# Patient Record
Sex: FEMALE | Race: WHITE | NOT HISPANIC OR LATINO | Employment: FULL TIME | URBAN - NONMETROPOLITAN AREA
[De-identification: names, ages, dates, MRNs, and addresses within clinical notes are randomized per-mention and may not be internally consistent; named-entity substitution may affect disease eponyms.]

---
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
## 2020-10-29 ENCOUNTER — OFFICE VISIT (OUTPATIENT)
Dept: OBSTETRICS AND GYNECOLOGY | Facility: CLINIC | Age: 20
End: 2020-10-29

## 2020-10-29 VITALS
DIASTOLIC BLOOD PRESSURE: 74 MMHG | HEIGHT: 67 IN | BODY MASS INDEX: 26.68 KG/M2 | WEIGHT: 170 LBS | SYSTOLIC BLOOD PRESSURE: 126 MMHG

## 2020-10-29 DIAGNOSIS — N94.6 DYSMENORRHEA: Primary | ICD-10-CM

## 2020-10-29 DIAGNOSIS — N92.0 MENORRHAGIA WITH REGULAR CYCLE: ICD-10-CM

## 2020-10-29 PROCEDURE — 99203 OFFICE O/P NEW LOW 30 MIN: CPT | Performed by: OBSTETRICS & GYNECOLOGY

## 2020-10-29 RX ORDER — NORETHINDRONE ACETATE AND ETHINYL ESTRADIOL, AND FERROUS FUMARATE 1MG-20(24)
1 KIT ORAL DAILY
Qty: 28 CAPSULE | Refills: 3 | COMMUNITY
Start: 2020-10-29 | End: 2021-03-24 | Stop reason: RX

## 2020-10-29 NOTE — PROGRESS NOTES
Subjective   Mara Slaughter is a 20 y.o. female  YOB: 2000    Chief Complaint   Patient presents with   • Contraception     Pt is here for contraception  Pt does have heavy periods.  She feels like the birth control will help      20 year old female  Patient's last menstrual period was 10/06/2020 (exact date) presents to establish care. Patient reports that her cycles are regular lasting five days. She reports that three days are heavy and she has to change a super tampon every couple of hours. She also reports that her cycles have progressively become more painful. She denies any medical problems at this time. She is not sexually active at this time but has been previously. She is getting  in December of this year. She denies drinking, smoking or drug use. She denies any history of migraines with aura. Denies any family history of blood clots or bleeding disorders. She does report a family history of breast cancer in her maternal grandmother.     Allergies   Allergen Reactions   • Cefdinir Hives and Itching   • Amoxicillin Rash   • Penicillins Swelling and Rash       Past Medical History:   Diagnosis Date   • Fracture of 5th metatarsal     Left   • PONV (postoperative nausea and vomiting)        Family History   Problem Relation Age of Onset   • Hypertension Father    • Breast cancer Maternal Grandmother        Social History     Socioeconomic History   • Marital status: Single     Spouse name: Not on file   • Number of children: Not on file   • Years of education: Not on file   • Highest education level: Not on file   Tobacco Use   • Smoking status: Never Smoker   • Smokeless tobacco: Never Used   Substance and Sexual Activity   • Alcohol use: No   • Drug use: No   • Sexual activity: Not Currently     Partners: Male     Birth control/protection: None         Current Outpatient Medications:   •  Norethin Ace-Eth Estrad-FE (Taytulla) 1-20 MG-MCG(24) capsule, Take 1 tablet by mouth Daily.,  Disp: 28 capsule, Rfl: 3    Patient's last menstrual period was 10/06/2020 (exact date).    Sexual History:         Could not be calculated    Past Surgical History:   Procedure Laterality Date   • EYE SURGERY      15 months of age   • TOE PERCUTANEOUS PINNING Left 7/18/2018    Procedure: PERCUTANEOUS SCREW FIXATION LEFT 5TH METATARSAL BASE;  Surgeon: Joseph Watkins MD;  Location: [facility];  Service: Orthopedics   • TONSILLECTOMY         Review of Systems   Constitutional: Negative for activity change and unexpected weight loss.   HENT: Negative for congestion.    Cardiovascular: Negative for chest pain.   Gastrointestinal: Negative for blood in stool, constipation and diarrhea.   Endocrine: Negative for cold intolerance and heat intolerance.   Genitourinary: Negative for dyspareunia, pelvic pain and vaginal discharge.   Musculoskeletal: Negative for arthralgias, back pain, neck pain and neck stiffness.   Skin: Negative for rash.   Neurological: Negative for dizziness and headache.   Psychiatric/Behavioral: Negative for sleep disturbance. The patient is not nervous/anxious.        Objective   Physical Exam  Vitals signs and nursing note reviewed.   Constitutional:       General: She is not in acute distress.     Appearance: She is well-developed.   HENT:      Head: Normocephalic and atraumatic.   Eyes:      General:         Right eye: No discharge.         Left eye: No discharge.      Conjunctiva/sclera: Conjunctivae normal.   Neck:      Musculoskeletal: Normal range of motion.      Thyroid: No thyromegaly.   Cardiovascular:      Rate and Rhythm: Normal rate and regular rhythm.      Heart sounds: No murmur.   Pulmonary:      Effort: Pulmonary effort is normal.      Breath sounds: Normal breath sounds. No wheezing or rhonchi.   Abdominal:      General: There is no distension.      Palpations: Abdomen is soft.      Tenderness: There is no abdominal tenderness.   Musculoskeletal: Normal range of motion.  "  Skin:     General: Skin is warm and dry.   Neurological:      Mental Status: She is alert and oriented to person, place, and time.   Psychiatric:         Behavior: Behavior normal.         Judgment: Judgment normal.           Vitals:    10/29/20 1135   BP: 126/74   Weight: 77.1 kg (170 lb)   Height: 168.9 cm (66.5\")       Diagnoses and all orders for this visit:    1. Dysmenorrhea (Primary)    2. Menorrhagia with regular cycle    Other orders  -     Norethin Ace-Eth Estrad-FE (Taytulla) 1-20 MG-MCG(24) capsule; Take 1 tablet by mouth Daily.  Dispense: 28 capsule; Refill: 3    -Discussed different management options for contraception at this time including OCPs, Orthoevra, Nuvaring, Depoprovera, Nexplanon and IUDs. Patient elected to be started on OCPs at this time. Discussed with patient risk, benefits at this time. Risk including increased risk for blood clots discussed.   -Counseled patient about safe sexual practices   -RTC for annual examination and PAP smear in 4 months or sooner if symptoms worsen.     Charelne Panda, DO       "

## 2021-01-08 ENCOUNTER — IMMUNIZATION (OUTPATIENT)
Dept: VACCINE CLINIC | Facility: HOSPITAL | Age: 21
End: 2021-01-08

## 2021-01-08 PROCEDURE — 91301 HC SARSCO02 VAC 100MCG/0.5ML IM: CPT | Performed by: OBSTETRICS & GYNECOLOGY

## 2021-01-08 PROCEDURE — 0011A: CPT | Performed by: OBSTETRICS & GYNECOLOGY

## 2021-01-09 ENCOUNTER — APPOINTMENT (OUTPATIENT)
Dept: VACCINE CLINIC | Facility: HOSPITAL | Age: 21
End: 2021-01-09

## 2021-02-05 ENCOUNTER — IMMUNIZATION (OUTPATIENT)
Dept: VACCINE CLINIC | Facility: HOSPITAL | Age: 21
End: 2021-02-05

## 2021-02-05 PROCEDURE — 91301 HC SARSCO02 VAC 100MCG/0.5ML IM: CPT | Performed by: OBSTETRICS & GYNECOLOGY

## 2021-02-05 PROCEDURE — 0012A: CPT | Performed by: OBSTETRICS & GYNECOLOGY

## 2021-02-06 ENCOUNTER — APPOINTMENT (OUTPATIENT)
Dept: VACCINE CLINIC | Facility: HOSPITAL | Age: 21
End: 2021-02-06

## 2021-03-04 ENCOUNTER — TELEPHONE (OUTPATIENT)
Dept: OBSTETRICS AND GYNECOLOGY | Facility: CLINIC | Age: 21
End: 2021-03-04

## 2021-03-24 ENCOUNTER — OFFICE VISIT (OUTPATIENT)
Dept: OBSTETRICS AND GYNECOLOGY | Facility: CLINIC | Age: 21
End: 2021-03-24

## 2021-03-24 VITALS
WEIGHT: 178 LBS | DIASTOLIC BLOOD PRESSURE: 70 MMHG | BODY MASS INDEX: 27.94 KG/M2 | HEIGHT: 67 IN | SYSTOLIC BLOOD PRESSURE: 122 MMHG

## 2021-03-24 DIAGNOSIS — Z12.4 SCREENING FOR CERVICAL CANCER: ICD-10-CM

## 2021-03-24 DIAGNOSIS — Z01.419 ENCOUNTER FOR GYNECOLOGICAL EXAMINATION WITHOUT ABNORMAL FINDING: Primary | ICD-10-CM

## 2021-03-24 PROCEDURE — G0123 SCREEN CERV/VAG THIN LAYER: HCPCS | Performed by: OBSTETRICS & GYNECOLOGY

## 2021-03-24 PROCEDURE — 99395 PREV VISIT EST AGE 18-39: CPT | Performed by: OBSTETRICS & GYNECOLOGY

## 2021-03-24 RX ORDER — NORETHINDRONE ACETATE AND ETHINYL ESTRADIOL AND FERROUS FUMARATE 1MG-20(24)
1 KIT ORAL DAILY
Qty: 28 TABLET | Refills: 9 | Status: SHIPPED | OUTPATIENT
Start: 2021-03-24 | End: 2022-01-07 | Stop reason: SDUPTHER

## 2021-03-25 LAB
GEN CATEG CVX/VAG CYTO-IMP: NORMAL
LAB AP CASE REPORT: NORMAL
LAB AP GYN ADDITIONAL INFORMATION: NORMAL
LAB AP GYN OTHER FINDINGS: NORMAL
PATH INTERP SPEC-IMP: NORMAL
STAT OF ADQ CVX/VAG CYTO-IMP: NORMAL

## 2021-03-26 ENCOUNTER — TELEPHONE (OUTPATIENT)
Dept: OBSTETRICS AND GYNECOLOGY | Facility: CLINIC | Age: 21
End: 2021-03-26

## 2021-10-28 ENCOUNTER — APPOINTMENT (OUTPATIENT)
Dept: CT IMAGING | Facility: HOSPITAL | Age: 21
End: 2021-10-28

## 2021-10-28 ENCOUNTER — APPOINTMENT (OUTPATIENT)
Dept: CARDIOLOGY | Facility: HOSPITAL | Age: 21
End: 2021-10-28

## 2021-10-28 ENCOUNTER — APPOINTMENT (OUTPATIENT)
Dept: GENERAL RADIOLOGY | Facility: HOSPITAL | Age: 21
End: 2021-10-28

## 2021-10-28 ENCOUNTER — HOSPITAL ENCOUNTER (INPATIENT)
Facility: HOSPITAL | Age: 21
LOS: 1 days | Discharge: ANOTHER HOSPITAL | End: 2021-10-28
Attending: EMERGENCY MEDICINE | Admitting: FAMILY MEDICINE

## 2021-10-28 VITALS
OXYGEN SATURATION: 98 % | BODY MASS INDEX: 30.29 KG/M2 | TEMPERATURE: 98.3 F | SYSTOLIC BLOOD PRESSURE: 136 MMHG | RESPIRATION RATE: 17 BRPM | WEIGHT: 193 LBS | HEART RATE: 108 BPM | HEIGHT: 67 IN | DIASTOLIC BLOOD PRESSURE: 86 MMHG

## 2021-10-28 DIAGNOSIS — I49.01 VENTRICULAR FIBRILLATION (HCC): Primary | ICD-10-CM

## 2021-10-28 DIAGNOSIS — I46.9 CARDIAC ARREST (HCC): ICD-10-CM

## 2021-10-28 PROBLEM — E87.6 HYPOKALEMIA: Status: ACTIVE | Noted: 2021-10-28

## 2021-10-28 PROBLEM — R79.89 ELEVATED LFTS: Status: ACTIVE | Noted: 2021-10-28

## 2021-10-28 PROBLEM — R55 SYNCOPE: Status: ACTIVE | Noted: 2021-10-28

## 2021-10-28 PROBLEM — E87.29 INCREASED ANION GAP METABOLIC ACIDOSIS: Status: ACTIVE | Noted: 2021-10-28

## 2021-10-28 LAB
ALBUMIN SERPL-MCNC: 4.4 G/DL (ref 3.5–5.2)
ALBUMIN/GLOB SERPL: 1.6 G/DL
ALP SERPL-CCNC: 91 U/L (ref 39–117)
ALT SERPL W P-5'-P-CCNC: 179 U/L (ref 1–33)
ANION GAP SERPL CALCULATED.3IONS-SCNC: 20 MMOL/L (ref 5–15)
ANISOCYTOSIS BLD QL: ABNORMAL
APTT PPP: 25.9 SECONDS (ref 24.1–35)
AST SERPL-CCNC: 161 U/L (ref 1–32)
B-HCG UR QL: NEGATIVE
BACTERIA UR QL AUTO: ABNORMAL /HPF
BASOPHILS # BLD MANUAL: 0.17 10*3/MM3 (ref 0–0.2)
BASOPHILS NFR BLD AUTO: 1 % (ref 0–1.5)
BH CV ECHO MEAS - AO MAX PG (FULL): 1.1 MMHG
BH CV ECHO MEAS - AO MAX PG: 8.6 MMHG
BH CV ECHO MEAS - AO MEAN PG (FULL): 0 MMHG
BH CV ECHO MEAS - AO MEAN PG: 5 MMHG
BH CV ECHO MEAS - AO ROOT AREA (BSA CORRECTED): 1.6
BH CV ECHO MEAS - AO ROOT AREA: 7.1 CM^2
BH CV ECHO MEAS - AO ROOT DIAM: 3 CM
BH CV ECHO MEAS - AO V2 MAX: 147 CM/SEC
BH CV ECHO MEAS - AO V2 MEAN: 105 CM/SEC
BH CV ECHO MEAS - AO V2 VTI: 23.8 CM
BH CV ECHO MEAS - AVA(I,A): 2.9 CM^2
BH CV ECHO MEAS - AVA(I,D): 2.9 CM^2
BH CV ECHO MEAS - AVA(V,A): 2.9 CM^2
BH CV ECHO MEAS - AVA(V,D): 2.9 CM^2
BH CV ECHO MEAS - BSA(HAYCOCK): 1.9 M^2
BH CV ECHO MEAS - BSA: 1.9 M^2
BH CV ECHO MEAS - BZI_BMI: 27.4 KILOGRAMS/M^2
BH CV ECHO MEAS - BZI_METRIC_HEIGHT: 167.6 CM
BH CV ECHO MEAS - BZI_METRIC_WEIGHT: 77.1 KG
BH CV ECHO MEAS - EDV(CUBED): 87.5 ML
BH CV ECHO MEAS - EDV(MOD-SP4): 84.1 ML
BH CV ECHO MEAS - EDV(TEICH): 89.6 ML
BH CV ECHO MEAS - EF(CUBED): 75.5 %
BH CV ECHO MEAS - EF(MOD-SP4): 61.1 %
BH CV ECHO MEAS - EF(TEICH): 67.6 %
BH CV ECHO MEAS - ESV(CUBED): 21.5 ML
BH CV ECHO MEAS - ESV(MOD-SP4): 32.7 ML
BH CV ECHO MEAS - ESV(TEICH): 29 ML
BH CV ECHO MEAS - FS: 37.4 %
BH CV ECHO MEAS - IVS/LVPW: 0.94
BH CV ECHO MEAS - IVSD: 0.94 CM
BH CV ECHO MEAS - LA DIMENSION: 3.1 CM
BH CV ECHO MEAS - LA/AO: 1
BH CV ECHO MEAS - LAT PEAK E' VEL: 16.8 CM/SEC
BH CV ECHO MEAS - LV DIASTOLIC VOL/BSA (35-75): 45.1 ML/M^2
BH CV ECHO MEAS - LV MASS(C)D: 144 GRAMS
BH CV ECHO MEAS - LV MASS(C)DI: 77.2 GRAMS/M^2
BH CV ECHO MEAS - LV MAX PG: 7.5 MMHG
BH CV ECHO MEAS - LV MEAN PG: 5 MMHG
BH CV ECHO MEAS - LV SYSTOLIC VOL/BSA (12-30): 17.5 ML/M^2
BH CV ECHO MEAS - LV V1 MAX: 137 CM/SEC
BH CV ECHO MEAS - LV V1 MEAN: 100 CM/SEC
BH CV ECHO MEAS - LV V1 VTI: 22.2 CM
BH CV ECHO MEAS - LVIDD: 4.4 CM
BH CV ECHO MEAS - LVIDS: 2.8 CM
BH CV ECHO MEAS - LVLD AP4: 8.2 CM
BH CV ECHO MEAS - LVLS AP4: 6.5 CM
BH CV ECHO MEAS - LVOT AREA (M): 3.1 CM^2
BH CV ECHO MEAS - LVOT AREA: 3.1 CM^2
BH CV ECHO MEAS - LVOT DIAM: 2 CM
BH CV ECHO MEAS - LVPWD: 1 CM
BH CV ECHO MEAS - MED PEAK E' VEL: 19.6 CM/SEC
BH CV ECHO MEAS - MV A MAX VEL: 74.2 CM/SEC
BH CV ECHO MEAS - MV DEC TIME: 0.12 SEC
BH CV ECHO MEAS - MV E MAX VEL: 102 CM/SEC
BH CV ECHO MEAS - MV E/A: 1.4
BH CV ECHO MEAS - PA MAX PG: 3.4 MMHG
BH CV ECHO MEAS - PA V2 MAX: 92.6 CM/SEC
BH CV ECHO MEAS - RAP SYSTOLE: 5 MMHG
BH CV ECHO MEAS - RVSP: 24.9 MMHG
BH CV ECHO MEAS - SI(AO): 90.1 ML/M^2
BH CV ECHO MEAS - SI(CUBED): 35.4 ML/M^2
BH CV ECHO MEAS - SI(LVOT): 37.4 ML/M^2
BH CV ECHO MEAS - SI(MOD-SP4): 27.5 ML/M^2
BH CV ECHO MEAS - SI(TEICH): 32.4 ML/M^2
BH CV ECHO MEAS - SV(AO): 168.2 ML
BH CV ECHO MEAS - SV(CUBED): 66 ML
BH CV ECHO MEAS - SV(LVOT): 69.7 ML
BH CV ECHO MEAS - SV(MOD-SP4): 51.4 ML
BH CV ECHO MEAS - SV(TEICH): 60.5 ML
BH CV ECHO MEAS - TR MAX VEL: 223 CM/SEC
BH CV ECHO MEASUREMENTS AVERAGE E/E' RATIO: 5.6
BILIRUB SERPL-MCNC: 0.2 MG/DL (ref 0–1.2)
BILIRUB UR QL STRIP: NEGATIVE
BUN SERPL-MCNC: 12 MG/DL (ref 6–20)
BUN/CREAT SERPL: 15 (ref 7–25)
CALCIUM SPEC-SCNC: 9.3 MG/DL (ref 8.6–10.5)
CHLORIDE SERPL-SCNC: 100 MMOL/L (ref 98–107)
CK SERPL-CCNC: 107 U/L (ref 20–180)
CLARITY UR: CLEAR
CO2 SERPL-SCNC: 17 MMOL/L (ref 22–29)
COLOR UR: YELLOW
CREAT SERPL-MCNC: 0.8 MG/DL (ref 0.57–1)
CYTOLOGIST CVX/VAG CYTO: NORMAL
D DIMER PPP FEU-MCNC: 0.45 MG/L (FEU) (ref 0–0.5)
DACRYOCYTES BLD QL SMEAR: ABNORMAL
DEPRECATED RDW RBC AUTO: 38.2 FL (ref 37–54)
ERYTHROCYTE [DISTWIDTH] IN BLOOD BY AUTOMATED COUNT: 11.9 % (ref 12.3–15.4)
EXPIRATION DATE: NORMAL
GFR SERPL CREATININE-BSD FRML MDRD: 91 ML/MIN/1.73
GLOBULIN UR ELPH-MCNC: 2.8 GM/DL
GLUCOSE SERPL-MCNC: 172 MG/DL (ref 65–99)
GLUCOSE UR STRIP-MCNC: NEGATIVE MG/DL
HCT VFR BLD AUTO: 43.4 % (ref 34–46.6)
HGB BLD-MCNC: 14.3 G/DL (ref 12–15.9)
HGB UR QL STRIP.AUTO: NEGATIVE
HYALINE CASTS UR QL AUTO: ABNORMAL /LPF
INR PPP: 0.94 (ref 0.91–1.09)
INTERNAL NEGATIVE CONTROL: NEGATIVE
INTERNAL POSITIVE CONTROL: POSITIVE
KETONES UR QL STRIP: NEGATIVE
LEFT ATRIUM VOLUME INDEX: 25.6 ML/M2
LEFT ATRIUM VOLUME: 47.9 CM3
LEUKOCYTE ESTERASE UR QL STRIP.AUTO: ABNORMAL
LYMPHOCYTES # BLD MANUAL: 7.16 10*3/MM3 (ref 0.7–3.1)
LYMPHOCYTES NFR BLD MANUAL: 40 % (ref 19.6–45.3)
LYMPHOCYTES NFR BLD MANUAL: 7 % (ref 5–12)
Lab: NORMAL
MAGNESIUM SERPL-MCNC: 2.2 MG/DL (ref 1.6–2.6)
MAXIMAL PREDICTED HEART RATE: 199 BPM
MCH RBC QN AUTO: 28.7 PG (ref 26.6–33)
MCHC RBC AUTO-ENTMCNC: 32.9 G/DL (ref 31.5–35.7)
MCV RBC AUTO: 87.1 FL (ref 79–97)
MONOCYTES # BLD AUTO: 1.19 10*3/MM3 (ref 0.1–0.9)
MYELOCYTES NFR BLD MANUAL: 1 % (ref 0–0)
NEUTROPHILS # BLD AUTO: 8.35 10*3/MM3 (ref 1.7–7)
NEUTROPHILS NFR BLD MANUAL: 49 % (ref 42.7–76)
NITRITE UR QL STRIP: NEGATIVE
NT-PROBNP SERPL-MCNC: <5 PG/ML (ref 0–450)
PATH INTERP BLD-IMP: NORMAL
PH UR STRIP.AUTO: 5.5 [PH] (ref 5–8)
PHOSPHATE SERPL-MCNC: 4.6 MG/DL (ref 2.5–4.5)
PLAT MORPH BLD: NORMAL
PLATELET # BLD AUTO: 417 10*3/MM3 (ref 140–450)
PMV BLD AUTO: 9.4 FL (ref 6–12)
POIKILOCYTOSIS BLD QL SMEAR: ABNORMAL
POLYCHROMASIA BLD QL SMEAR: ABNORMAL
POTASSIUM SERPL-SCNC: 3.2 MMOL/L (ref 3.5–5.2)
PROT SERPL-MCNC: 7.2 G/DL (ref 6–8.5)
PROT UR QL STRIP: ABNORMAL
PROTHROMBIN TIME: 12.2 SECONDS (ref 11.9–14.6)
RBC # BLD AUTO: 4.98 10*6/MM3 (ref 3.77–5.28)
RBC # UR: ABNORMAL /HPF
REF LAB TEST METHOD: ABNORMAL
SARS-COV-2 RNA PNL SPEC NAA+PROBE: NOT DETECTED
SODIUM SERPL-SCNC: 137 MMOL/L (ref 136–145)
SP GR UR STRIP: 1.02 (ref 1–1.03)
SQUAMOUS #/AREA URNS HPF: ABNORMAL /HPF
STRESS TARGET HR: 169 BPM
T4 FREE SERPL-MCNC: 1.11 NG/DL (ref 0.93–1.7)
TROPONIN T SERPL-MCNC: <0.01 NG/ML (ref 0–0.03)
TSH SERPL DL<=0.05 MIU/L-ACNC: 10.93 UIU/ML (ref 0.27–4.2)
UROBILINOGEN UR QL STRIP: ABNORMAL
VARIANT LYMPHS NFR BLD MANUAL: 2 % (ref 0–5)
WBC # BLD AUTO: 17.05 10*3/MM3 (ref 3.4–10.8)
WBC MORPH BLD: NORMAL
WBC UR QL AUTO: ABNORMAL /HPF

## 2021-10-28 PROCEDURE — 84100 ASSAY OF PHOSPHORUS: CPT | Performed by: EMERGENCY MEDICINE

## 2021-10-28 PROCEDURE — 93306 TTE W/DOPPLER COMPLETE: CPT | Performed by: INTERNAL MEDICINE

## 2021-10-28 PROCEDURE — 25010000002 AMIODARONE IN DEXTROSE 5% 150-4.21 MG/100ML-% SOLUTION: Performed by: EMERGENCY MEDICINE

## 2021-10-28 PROCEDURE — 85025 COMPLETE CBC W/AUTO DIFF WBC: CPT | Performed by: EMERGENCY MEDICINE

## 2021-10-28 PROCEDURE — 71045 X-RAY EXAM CHEST 1 VIEW: CPT

## 2021-10-28 PROCEDURE — 99285 EMERGENCY DEPT VISIT HI MDM: CPT

## 2021-10-28 PROCEDURE — 93005 ELECTROCARDIOGRAM TRACING: CPT | Performed by: EMERGENCY MEDICINE

## 2021-10-28 PROCEDURE — 25010000002 LORAZEPAM PER 2 MG: Performed by: EMERGENCY MEDICINE

## 2021-10-28 PROCEDURE — 85060 BLOOD SMEAR INTERPRETATION: CPT | Performed by: EMERGENCY MEDICINE

## 2021-10-28 PROCEDURE — 85730 THROMBOPLASTIN TIME PARTIAL: CPT | Performed by: EMERGENCY MEDICINE

## 2021-10-28 PROCEDURE — 81025 URINE PREGNANCY TEST: CPT | Performed by: EMERGENCY MEDICINE

## 2021-10-28 PROCEDURE — 85007 BL SMEAR W/DIFF WBC COUNT: CPT | Performed by: EMERGENCY MEDICINE

## 2021-10-28 PROCEDURE — 84484 ASSAY OF TROPONIN QUANT: CPT | Performed by: EMERGENCY MEDICINE

## 2021-10-28 PROCEDURE — 25010000002 ENOXAPARIN PER 10 MG: Performed by: FAMILY MEDICINE

## 2021-10-28 PROCEDURE — 70450 CT HEAD/BRAIN W/O DYE: CPT

## 2021-10-28 PROCEDURE — 85610 PROTHROMBIN TIME: CPT | Performed by: EMERGENCY MEDICINE

## 2021-10-28 PROCEDURE — 25010000002 POTASSIUM CHLORIDE PER 2 MEQ: Performed by: EMERGENCY MEDICINE

## 2021-10-28 PROCEDURE — 87635 SARS-COV-2 COVID-19 AMP PRB: CPT | Performed by: EMERGENCY MEDICINE

## 2021-10-28 PROCEDURE — 84443 ASSAY THYROID STIM HORMONE: CPT | Performed by: EMERGENCY MEDICINE

## 2021-10-28 PROCEDURE — 93306 TTE W/DOPPLER COMPLETE: CPT

## 2021-10-28 PROCEDURE — 84439 ASSAY OF FREE THYROXINE: CPT | Performed by: EMERGENCY MEDICINE

## 2021-10-28 PROCEDURE — 99223 1ST HOSP IP/OBS HIGH 75: CPT | Performed by: INTERNAL MEDICINE

## 2021-10-28 PROCEDURE — 83735 ASSAY OF MAGNESIUM: CPT | Performed by: EMERGENCY MEDICINE

## 2021-10-28 PROCEDURE — 85379 FIBRIN DEGRADATION QUANT: CPT | Performed by: EMERGENCY MEDICINE

## 2021-10-28 PROCEDURE — 80053 COMPREHEN METABOLIC PANEL: CPT | Performed by: EMERGENCY MEDICINE

## 2021-10-28 PROCEDURE — 82550 ASSAY OF CK (CPK): CPT | Performed by: EMERGENCY MEDICINE

## 2021-10-28 PROCEDURE — 25010000002 AMIODARONE IN DEXTROSE 5% 360-4.14 MG/200ML-% SOLUTION: Performed by: EMERGENCY MEDICINE

## 2021-10-28 PROCEDURE — 81001 URINALYSIS AUTO W/SCOPE: CPT | Performed by: EMERGENCY MEDICINE

## 2021-10-28 PROCEDURE — 83880 ASSAY OF NATRIURETIC PEPTIDE: CPT | Performed by: EMERGENCY MEDICINE

## 2021-10-28 PROCEDURE — 63710000001 PROMETHAZINE PER 25 MG: Performed by: FAMILY MEDICINE

## 2021-10-28 PROCEDURE — 25010000002 AMIODARONE IN DEXTROSE 5% 360-4.14 MG/200ML-% SOLUTION: Performed by: FAMILY MEDICINE

## 2021-10-28 RX ORDER — POTASSIUM CHLORIDE 14.9 MG/ML
20 INJECTION INTRAVENOUS ONCE
Status: COMPLETED | OUTPATIENT
Start: 2021-10-28 | End: 2021-10-28

## 2021-10-28 RX ORDER — PHENOL 1.4 %
1 AEROSOL, SPRAY (ML) MUCOUS MEMBRANE DAILY PRN
COMMUNITY

## 2021-10-28 RX ORDER — SODIUM CHLORIDE 0.9 % (FLUSH) 0.9 %
10 SYRINGE (ML) INJECTION EVERY 12 HOURS SCHEDULED
Status: DISCONTINUED | OUTPATIENT
Start: 2021-10-28 | End: 2021-10-28 | Stop reason: HOSPADM

## 2021-10-28 RX ORDER — ACETAMINOPHEN 325 MG/1
650 TABLET ORAL EVERY 4 HOURS PRN
Status: DISCONTINUED | OUTPATIENT
Start: 2021-10-28 | End: 2021-10-28 | Stop reason: HOSPADM

## 2021-10-28 RX ORDER — SERTRALINE HYDROCHLORIDE 100 MG/1
150 TABLET, FILM COATED ORAL DAILY
COMMUNITY
End: 2022-06-16 | Stop reason: SDUPTHER

## 2021-10-28 RX ORDER — LORAZEPAM 2 MG/ML
1 INJECTION INTRAMUSCULAR ONCE
Status: COMPLETED | OUTPATIENT
Start: 2021-10-28 | End: 2021-10-28

## 2021-10-28 RX ORDER — SODIUM CHLORIDE 0.9 % (FLUSH) 0.9 %
10 SYRINGE (ML) INJECTION AS NEEDED
Status: DISCONTINUED | OUTPATIENT
Start: 2021-10-28 | End: 2021-10-28 | Stop reason: HOSPADM

## 2021-10-28 RX ORDER — PROMETHAZINE HYDROCHLORIDE 6.25 MG/5ML
12.5 SYRUP ORAL EVERY 6 HOURS PRN
Status: DISCONTINUED | OUTPATIENT
Start: 2021-10-28 | End: 2021-10-28 | Stop reason: HOSPADM

## 2021-10-28 RX ORDER — FLUOXETINE HYDROCHLORIDE 20 MG/1
40 CAPSULE ORAL DAILY
Status: DISCONTINUED | OUTPATIENT
Start: 2021-10-28 | End: 2021-10-28 | Stop reason: HOSPADM

## 2021-10-28 RX ORDER — NITROGLYCERIN 0.4 MG/1
0.4 TABLET SUBLINGUAL
Status: DISCONTINUED | OUTPATIENT
Start: 2021-10-28 | End: 2021-10-28 | Stop reason: HOSPADM

## 2021-10-28 RX ORDER — BUSPIRONE HYDROCHLORIDE 5 MG/1
5 TABLET ORAL EVERY 12 HOURS SCHEDULED
Status: DISCONTINUED | OUTPATIENT
Start: 2021-10-28 | End: 2021-10-28 | Stop reason: HOSPADM

## 2021-10-28 RX ADMIN — Medication 10 ML: at 09:25

## 2021-10-28 RX ADMIN — LORAZEPAM 1 MG: 2 INJECTION INTRAMUSCULAR; INTRAVENOUS at 06:12

## 2021-10-28 RX ADMIN — AMIODARONE HYDROCHLORIDE 1 MG/MIN: 1.8 INJECTION, SOLUTION INTRAVENOUS at 06:22

## 2021-10-28 RX ADMIN — Medication 10 ML: at 20:28

## 2021-10-28 RX ADMIN — AMIODARONE HYDROCHLORIDE 0.5 MG/MIN: 1.8 INJECTION, SOLUTION INTRAVENOUS at 12:02

## 2021-10-28 RX ADMIN — ENOXAPARIN SODIUM 40 MG: 40 INJECTION SUBCUTANEOUS at 09:37

## 2021-10-28 RX ADMIN — POTASSIUM CHLORIDE 20 MEQ: 200 INJECTION, SOLUTION INTRAVENOUS at 07:23

## 2021-10-28 RX ADMIN — AMIODARONE HYDROCHLORIDE 150 MG: 1.5 INJECTION, SOLUTION INTRAVENOUS at 06:06

## 2021-10-28 RX ADMIN — PROMETHAZINE HYDROCHLORIDE 12.5 MG: 6.25 SYRUP ORAL at 20:41

## 2021-10-30 LAB
QT INTERVAL: 300 MS
QTC INTERVAL: 444 MS

## 2021-11-10 ENCOUNTER — PATIENT OUTREACH (OUTPATIENT)
Dept: CASE MANAGEMENT | Facility: OTHER | Age: 21
End: 2021-11-10

## 2021-11-10 NOTE — OUTREACH NOTE
Care Evaluation    Questions/Answers      Most Recent Value   Suggested Appointments --  [Patient states she will she cardiolgist next week]   Other Patient Education/Resources  24/7 [facility] Nurse Call Line   24/7 Nurse Call Line Education Method Send Materials   Healthy Lifestyle (Self-Efficacy) --  [patient states she feels comfortable with when  to return to ED]        General & Health Literacy Assessment    Questions/Answers      Most Recent Value   Assessment Completed With Patient   Living Arrangement Spouse   Type of Residence Private Residence   Home Care Services No   Equiptment Used at Home None   Communication Device No   Bed or Wheelchair Confined No   Difficulty Keeping Appointments No   Spiritism or Spiritual Beliefs that Impact Treatment No   Chronic Pain No   How often do you have someone help you read hospital materials? Never   How often do you have problems learning about your medical condition because of difficulty understanding written information? Never   How often do you have a problem understanding what is told to you about your medical condition? Never   How confident are you filling out medical forms by yourself? Extremely   Health Literacy Moderate          Ambulatory Case Management Note    11/10/21 Telephone call with patient.  She reports doing ok.  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  She has an appointment with cardiologist next week.  She has support at home and reports she  feels comfortable with the instruction provided by ED of symptoms to return.      No issue with social determinants,denies food, medication, transportation, or financial insecurities. Sending patient 24/7 Nurse Line Telephone number; ECM contact information;  ECM program information. Patient verbalized understanding.   No further questions or concerns voiced at this time. Patient confirmed personal email address in chart is ok to use.    Advised pt to call RN-ECM         Care  Plan: Ventricular Fibrillation   Updates made since 11/10/2021 12:00 AM      Problem: Ventricular Fibrillation Knowledge Deficit       Goal: Patient will have sufficient knowledge of condition    Expected End Date: 12/30/2021      Goal: Patient will verbalize understanding of health maintenance       Task: Educate patient on importance of treating and maintaining other conditions    Responsible User: Arti Vazquez RN      Problem: EXERCISE REGIMEN       Goal: Patient will engage in physical activity safely       Task: Educate patient on maintaining safety during exercise activity    Due Date: 12/30/2021   Responsible User: Arti Vazquez RN Lea Martin, RN  Ambulatory Case Management    11/10/2021, 10:02 EST

## 2021-11-16 DIAGNOSIS — I46.9 CARDIAC ARREST (HCC): Primary | ICD-10-CM

## 2021-11-19 ENCOUNTER — HOSPITAL ENCOUNTER (OUTPATIENT)
Dept: CARDIOLOGY | Facility: HOSPITAL | Age: 21
Discharge: HOME | End: 2021-11-19
Admitting: INTERNAL MEDICINE

## 2021-11-19 VITALS
BODY MASS INDEX: 30.13 KG/M2 | SYSTOLIC BLOOD PRESSURE: 123 MMHG | HEIGHT: 67 IN | WEIGHT: 192 LBS | HEART RATE: 89 BPM | DIASTOLIC BLOOD PRESSURE: 66 MMHG

## 2021-11-19 DIAGNOSIS — I46.9 CARDIAC ARREST (HCC): ICD-10-CM

## 2021-11-19 PROCEDURE — 93018 CV STRESS TEST I&R ONLY: CPT | Performed by: EMERGENCY MEDICINE

## 2021-11-19 PROCEDURE — 93017 CV STRESS TEST TRACING ONLY: CPT

## 2021-11-19 RX ORDER — METOPROLOL SUCCINATE 25 MG/1
25 TABLET, EXTENDED RELEASE ORAL DAILY
COMMUNITY
End: 2022-06-16 | Stop reason: SDUPTHER

## 2021-11-23 LAB
BH CV STRESS BP STAGE 1: NORMAL
BH CV STRESS BP STAGE 2: NORMAL
BH CV STRESS BP STAGE 3: NORMAL
BH CV STRESS BP STAGE 4: NORMAL
BH CV STRESS DURATION MIN STAGE 1: 3
BH CV STRESS DURATION MIN STAGE 2: 3
BH CV STRESS DURATION MIN STAGE 3: 3
BH CV STRESS DURATION MIN STAGE 4: 0
BH CV STRESS DURATION SEC STAGE 1: 0
BH CV STRESS DURATION SEC STAGE 2: 0
BH CV STRESS DURATION SEC STAGE 3: 0
BH CV STRESS DURATION SEC STAGE 4: 28
BH CV STRESS GRADE STAGE 1: 10
BH CV STRESS GRADE STAGE 2: 12
BH CV STRESS GRADE STAGE 3: 14
BH CV STRESS GRADE STAGE 4: 16
BH CV STRESS HR STAGE 1: 134
BH CV STRESS HR STAGE 2: 148
BH CV STRESS HR STAGE 3: 164
BH CV STRESS HR STAGE 4: 171
BH CV STRESS METS STAGE 1: 5
BH CV STRESS METS STAGE 2: 7.5
BH CV STRESS METS STAGE 3: 10
BH CV STRESS METS STAGE 4: 13.5
BH CV STRESS PROTOCOL 1: NORMAL
BH CV STRESS RECOVERY BP: NORMAL MMHG
BH CV STRESS RECOVERY HR: 110 BPM
BH CV STRESS SPEED STAGE 1: 1.7
BH CV STRESS SPEED STAGE 2: 2.5
BH CV STRESS SPEED STAGE 3: 3.4
BH CV STRESS SPEED STAGE 4: 4.2
BH CV STRESS STAGE 1: 1
BH CV STRESS STAGE 2: 2
BH CV STRESS STAGE 3: 3
BH CV STRESS STAGE 4: 4
MAXIMAL PREDICTED HEART RATE: 199 BPM
PERCENT MAX PREDICTED HR: 85.93 %
STRESS BASELINE BP: NORMAL MMHG
STRESS BASELINE HR: 90 BPM
STRESS PERCENT HR: 101 %
STRESS POST ESTIMATED WORKLOAD: 13.5 METS
STRESS POST EXERCISE DUR MIN: 9 MIN
STRESS POST EXERCISE DUR SEC: 28 SEC
STRESS POST PEAK BP: NORMAL MMHG
STRESS POST PEAK HR: 171 BPM
STRESS TARGET HR: 169 BPM

## 2022-01-07 RX ORDER — NORETHINDRONE ACETATE AND ETHINYL ESTRADIOL AND FERROUS FUMARATE 1MG-20(24)
1 KIT ORAL DAILY
Qty: 28 TABLET | Refills: 2 | Status: SHIPPED | OUTPATIENT
Start: 2022-01-07 | End: 2022-04-26 | Stop reason: SDUPTHER

## 2022-01-27 ENCOUNTER — PATIENT OUTREACH (OUTPATIENT)
Dept: CASE MANAGEMENT | Facility: OTHER | Age: 22
End: 2022-01-27

## 2022-01-27 NOTE — OUTREACH NOTE
Ambulatory Case Management Note    Created this note  in error  Arti Vazquez RN  Ambulatory Case Management    1/27/2022, 11:13 EST

## 2022-01-27 NOTE — OUTREACH NOTE
1/27/22 Call with patient.  She reports she is feeling good and energy is returning.  She continues to feel some fatigue but thinks this may be related to side effects of the metoprolol succinate  and or working third shift.  She has an appointment scheduled in March to see her Cardiologist Dr. Barragan and will discuss medication at this time..  We discussed keeping a log of Heart Rate to share with Dr. Barragan at the appointment.  I encouraged her to take it at different times and levels of activity.  Patient and I reviewed all open care gaps and she declined information on Advance Care Planning.  No other issues identified at this time.    No issue with social determinants,denies food, medication, transportation, or financial insecurities. Reviewed with patient My Chart;  No further questions or concerns voiced at this time. .    Advised pt to call RN-ECM

## 2022-04-15 RX ORDER — NORETHINDRONE ACETATE AND ETHINYL ESTRADIOL AND FERROUS FUMARATE 1MG-20(24)
1 KIT ORAL DAILY
Qty: 28 TABLET | Refills: 2 | OUTPATIENT
Start: 2022-04-15 | End: 2023-04-15

## 2022-04-25 ENCOUNTER — PATIENT MESSAGE (OUTPATIENT)
Dept: OBSTETRICS AND GYNECOLOGY | Facility: CLINIC | Age: 22
End: 2022-04-25

## 2022-04-25 DIAGNOSIS — N92.0 MENORRHAGIA WITH REGULAR CYCLE: Primary | ICD-10-CM

## 2022-04-25 RX ORDER — NORETHINDRONE ACETATE AND ETHINYL ESTRADIOL AND FERROUS FUMARATE 1MG-20(24)
1 KIT ORAL DAILY
Qty: 28 TABLET | Refills: 2 | OUTPATIENT
Start: 2022-04-25 | End: 2023-04-25

## 2022-04-26 ENCOUNTER — PATIENT OUTREACH (OUTPATIENT)
Dept: CASE MANAGEMENT | Facility: OTHER | Age: 22
End: 2022-04-26

## 2022-04-26 RX ORDER — NORETHINDRONE ACETATE AND ETHINYL ESTRADIOL AND FERROUS FUMARATE 1MG-20(24)
1 KIT ORAL DAILY
Qty: 28 TABLET | Refills: 2 | Status: SHIPPED | OUTPATIENT
Start: 2022-04-26 | End: 2022-06-16 | Stop reason: SDUPTHER

## 2022-04-26 NOTE — OUTREACH NOTE
AMBULATORY CASE MANAGEMENT NOTE    Name and Relationship of Patient/Support Person: Mara Moreno - Self    Patient Outreach    Brief call with patient she reports she is doing well at this time.  Encouraged patient to reach out to Employee Case Management as needed.    Education Documentation  No documentation found.        ANA MARIA BELL  Ambulatory Case Management    4/26/2022, 14:18 EDT

## 2022-06-16 ENCOUNTER — OFFICE VISIT (OUTPATIENT)
Dept: OBSTETRICS AND GYNECOLOGY | Facility: CLINIC | Age: 22
End: 2022-06-16

## 2022-06-16 VITALS
HEIGHT: 67 IN | BODY MASS INDEX: 35.47 KG/M2 | WEIGHT: 226 LBS | DIASTOLIC BLOOD PRESSURE: 78 MMHG | SYSTOLIC BLOOD PRESSURE: 118 MMHG

## 2022-06-16 DIAGNOSIS — Z01.419 ENCOUNTER FOR GYNECOLOGICAL EXAMINATION WITHOUT ABNORMAL FINDING: Primary | ICD-10-CM

## 2022-06-16 PROCEDURE — 99395 PREV VISIT EST AGE 18-39: CPT | Performed by: OBSTETRICS & GYNECOLOGY

## 2022-06-16 RX ORDER — NORETHINDRONE ACETATE AND ETHINYL ESTRADIOL AND FERROUS FUMARATE 1MG-20(24)
1 KIT ORAL DAILY
Qty: 28 TABLET | Refills: 12 | Status: SHIPPED | OUTPATIENT
Start: 2022-06-16

## 2022-06-16 NOTE — PROGRESS NOTES
Subjective   Mara Moreno is a 22 y.o. female  YOB: 2000    Chief Complaint   Patient presents with   • Gynecologic Exam     Patient here for yearly exam. Last exam was done 3/24/21 and was normal. Patient is on LoEstrin 24 FE OCP an doing well with it. Patient needs refills sent to her pharmacy. Patient voices no complaints or concerns.      22 year old female  LMP 6- presents for annual examination. Patient reports overall she is doing well. She reports regular cycles lasting approximately 5 days. She denies that they are heavy or painful. She reports that she is happy on her current OCP. Her last pap smear was in  and was ANDREW. Her medical and surgical history are up to date. She reports social drinking. She is sexually active with one partner without complaints. She reports her maternal grandmother had breast cancer diagnosed with age 50.     Allergies   Allergen Reactions   • Cefdinir Hives and Itching   • Amoxicillin Rash   • Penicillins Swelling and Rash       Past Medical History:   Diagnosis Date   • Anxiety    • Fracture of 5th metatarsal     Left   • Hypertension    • PONV (postoperative nausea and vomiting)        Family History   Problem Relation Age of Onset   • Hypertension Father    • Breast cancer Maternal Grandmother    • Ovarian cancer Neg Hx    • Uterine cancer Neg Hx    • Melanoma Neg Hx    • Colon cancer Neg Hx        Social History     Socioeconomic History   • Marital status:    Tobacco Use   • Smoking status: Never Smoker   • Smokeless tobacco: Never Used   Substance and Sexual Activity   • Alcohol use: Yes   • Drug use: No   • Sexual activity: Not Currently     Partners: Male     Birth control/protection: None         Current Outpatient Medications:   •  Melatonin 10 MG tablet, Take 1 tablet by mouth Daily As Needed., Disp: , Rfl:   •  metoprolol succinate XL (TOPROL-XL) 25 MG 24 hr tablet, Take 1 tablet by mouth Daily., Disp: 30 tablet, Rfl: 12  •   metoprolol succinate XL (TOPROL-XL) 25 MG 24 hr tablet, Take 1 tablet by mouth Daily., Disp: 90 tablet, Rfl: 3  •  norethindrone-ethinyl estradiol-ferrous fumarate (LOESTIN 24 FE) 1-20 MG-MCG(24) per tablet, Take 1 tablet by mouth Daily., Disp: 28 tablet, Rfl: 12  •  sertraline (ZOLOFT) 100 MG tablet, Take 1.5 tablets by mouth Daily., Disp: 135 tablet, Rfl: 3    No LMP recorded.    Sexual History:         Could not be calculated    Past Surgical History:   Procedure Laterality Date   • CARDIAC DEFIBRILLATOR PLACEMENT     • EYE SURGERY      15 months of age   • TOE PERCUTANEOUS PINNING Left 07/18/2018    Procedure: PERCUTANEOUS SCREW FIXATION LEFT 5TH METATARSAL BASE;  Surgeon: Joseph Watkins MD;  Location: [facility];  Service: Orthopedics   • TONSILLECTOMY         Review of Systems   Constitutional: Negative for activity change and unexpected weight loss.   HENT: Negative for congestion.    Cardiovascular: Negative for chest pain.   Gastrointestinal: Negative for blood in stool, constipation and diarrhea.        Blood with wiping   Endocrine: Positive for heat intolerance. Negative for cold intolerance.   Genitourinary: Negative for dyspareunia, pelvic pain and vaginal discharge.   Musculoskeletal: Negative for arthralgias, back pain and neck pain.   Skin: Negative for rash.   Neurological: Negative for dizziness and headache.   Psychiatric/Behavioral: Negative for sleep disturbance. The patient is not nervous/anxious.        Objective   Physical Exam  Vitals and nursing note reviewed.   Constitutional:       General: She is not in acute distress.     Appearance: She is well-developed. She is obese.   HENT:      Head: Normocephalic and atraumatic.   Eyes:      Conjunctiva/sclera: Conjunctivae normal.   Cardiovascular:      Rate and Rhythm: Normal rate and regular rhythm.      Heart sounds: No murmur heard.  Pulmonary:      Effort: Pulmonary effort is normal.      Breath sounds: Normal breath sounds.   Chest:  "  Breasts:      Right: No inverted nipple or mass.      Left: No inverted nipple or mass.       Abdominal:      General: There is no distension.      Palpations: Abdomen is soft.      Tenderness: There is no abdominal tenderness.   Genitourinary:     Exam position: Supine.      Labia:         Right: No tenderness or lesion.         Left: No tenderness or lesion.       Vagina: Normal. No vaginal discharge, tenderness or bleeding.      Cervix: No cervical motion tenderness, discharge or friability.      Adnexa:         Right: No tenderness or fullness.          Left: No tenderness or fullness.     Musculoskeletal:         General: Normal range of motion.      Cervical back: Normal range of motion and neck supple.   Skin:     General: Skin is warm and dry.   Neurological:      Mental Status: She is alert and oriented to person, place, and time.   Psychiatric:         Behavior: Behavior normal.         Judgment: Judgment normal.           Vitals:    06/16/22 1001   BP: 118/78   Weight: 103 kg (226 lb)   Height: 168.9 cm (66.5\")       Diagnoses and all orders for this visit:    1. Encounter for gynecological examination without abnormal finding (Primary)    Other orders  -     norethindrone-ethinyl estradiol-ferrous fumarate (LOESTIN 24 FE) 1-20 MG-MCG(24) per tablet; Take 1 tablet by mouth Daily.  Dispense: 28 tablet; Refill: 12    Normal GYN exam.   Encouraged SBE, pt is aware how to do self breast exam and the importance of same.   Discussed weight management and importance of maintaining a healthy weight.   PAP smear not indicated   Refilled of OCP   RTC in 1 year or sooner as clinically indicated.     Charlene Panda, DO       "

## 2022-09-16 ENCOUNTER — TRANSCRIBE ORDERS (OUTPATIENT)
Dept: ADMINISTRATIVE | Facility: HOSPITAL | Age: 22
End: 2022-09-16

## 2022-09-16 ENCOUNTER — LAB (OUTPATIENT)
Dept: LAB | Facility: HOSPITAL | Age: 22
End: 2022-09-16

## 2022-09-16 DIAGNOSIS — Z95.810 AUTOMATIC IMPLANTABLE CARDIOVERTER-DEFIBRILLATOR PROBLEM: ICD-10-CM

## 2022-09-16 DIAGNOSIS — T82.9XXA AUTOMATIC IMPLANTABLE CARDIOVERTER-DEFIBRILLATOR PROBLEM: ICD-10-CM

## 2022-09-16 DIAGNOSIS — I49.01 VENTRICULAR FIBRILLATION: ICD-10-CM

## 2022-09-16 DIAGNOSIS — I49.01 VENTRICULAR FIBRILLATION: Primary | ICD-10-CM

## 2022-09-16 LAB
ALBUMIN SERPL-MCNC: 4.4 G/DL (ref 3.5–5)
ALBUMIN/GLOB SERPL: 1.3 G/DL (ref 1.1–2.5)
ALP SERPL-CCNC: 91 U/L (ref 24–120)
ALT SERPL W P-5'-P-CCNC: 60 U/L (ref 0–35)
ANION GAP SERPL CALCULATED.3IONS-SCNC: 8 MMOL/L (ref 4–13)
AST SERPL-CCNC: 52 U/L (ref 7–45)
AUTO MIXED CELLS #: 0.4 10*3/MM3 (ref 0.1–2.6)
AUTO MIXED CELLS %: 5.3 % (ref 0.1–24)
BILIRUB SERPL-MCNC: 0.4 MG/DL (ref 0.1–1)
BUN SERPL-MCNC: 11 MG/DL (ref 5–21)
BUN/CREAT SERPL: 17.2
CALCIUM SPEC-SCNC: 9.3 MG/DL (ref 8.4–10.4)
CHLORIDE SERPL-SCNC: 100 MMOL/L (ref 98–110)
CHOLEST SERPL-MCNC: 205 MG/DL (ref 130–200)
CO2 SERPL-SCNC: 29 MMOL/L (ref 24–31)
CREAT SERPL-MCNC: 0.64 MG/DL (ref 0.5–1.4)
EGFRCR SERPLBLD CKD-EPI 2021: 128.3 ML/MIN/1.73
ERYTHROCYTE [DISTWIDTH] IN BLOOD BY AUTOMATED COUNT: 12 % (ref 12.3–15.4)
GLOBULIN UR ELPH-MCNC: 3.3 GM/DL
GLUCOSE SERPL-MCNC: 100 MG/DL (ref 70–100)
HCT VFR BLD AUTO: 41.3 % (ref 34–46.6)
HDLC SERPL-MCNC: 59 MG/DL
HGB BLD-MCNC: 13.7 G/DL (ref 12–15.9)
LDLC SERPL CALC-MCNC: 129 MG/DL (ref 0–99)
LDLC/HDLC SERPL: 2.15 {RATIO}
LYMPHOCYTES # BLD AUTO: 2.3 10*3/MM3 (ref 0.7–3.1)
LYMPHOCYTES NFR BLD AUTO: 28.4 % (ref 19.6–45.3)
MCH RBC QN AUTO: 28.7 PG (ref 26.6–33)
MCHC RBC AUTO-ENTMCNC: 33.2 G/DL (ref 31.5–35.7)
MCV RBC AUTO: 86.6 FL (ref 79–97)
NEUTROPHILS NFR BLD AUTO: 5.3 10*3/MM3 (ref 1.7–7)
NEUTROPHILS NFR BLD AUTO: 66.3 % (ref 42.7–76)
PLATELET # BLD AUTO: 265 10*3/MM3 (ref 140–450)
PMV BLD AUTO: 9 FL (ref 6–12)
POTASSIUM SERPL-SCNC: 4.3 MMOL/L (ref 3.5–5.3)
PROT SERPL-MCNC: 7.7 G/DL (ref 6.3–8.7)
RBC # BLD AUTO: 4.77 10*6/MM3 (ref 3.77–5.28)
SODIUM SERPL-SCNC: 137 MMOL/L (ref 135–145)
T3FREE SERPL-MCNC: 3.85 PG/ML (ref 2–4.4)
T4 FREE SERPL-MCNC: 1.15 NG/DL (ref 0.93–1.7)
TRIGL SERPL-MCNC: 95 MG/DL (ref 0–149)
TSH SERPL DL<=0.05 MIU/L-ACNC: 2.87 UIU/ML (ref 0.27–4.2)
VLDLC SERPL-MCNC: 17 MG/DL (ref 5–40)
WBC NRBC COR # BLD: 8 10*3/MM3 (ref 3.4–10.8)

## 2022-09-16 PROCEDURE — 80061 LIPID PANEL: CPT

## 2022-09-16 PROCEDURE — 84481 FREE ASSAY (FT-3): CPT

## 2022-09-16 PROCEDURE — 84439 ASSAY OF FREE THYROXINE: CPT

## 2022-09-16 PROCEDURE — 80050 GENERAL HEALTH PANEL: CPT

## 2022-09-16 PROCEDURE — 36415 COLL VENOUS BLD VENIPUNCTURE: CPT | Performed by: FAMILY MEDICINE

## 2022-09-20 ENCOUNTER — TRANSCRIBE ORDERS (OUTPATIENT)
Dept: ADMINISTRATIVE | Facility: HOSPITAL | Age: 22
End: 2022-09-20

## 2022-09-20 ENCOUNTER — LAB (OUTPATIENT)
Dept: LAB | Facility: HOSPITAL | Age: 22
End: 2022-09-20

## 2022-09-20 DIAGNOSIS — R63.5 ABNORMAL WEIGHT GAIN: ICD-10-CM

## 2022-09-20 DIAGNOSIS — N92.6 IRREGULAR MENSTRUAL CYCLE: ICD-10-CM

## 2022-09-20 DIAGNOSIS — N92.6 IRREGULAR MENSTRUAL CYCLE: Primary | ICD-10-CM

## 2022-09-20 LAB
FSH SERPL-ACNC: 3.35 MIU/ML
HBA1C MFR BLD: 5.5 % (ref 4.8–5.9)
LH SERPL-ACNC: 8.14 MIU/ML
TESTOST SERPL-MCNC: 28 NG/DL (ref 8.4–48.1)

## 2022-09-20 PROCEDURE — 83002 ASSAY OF GONADOTROPIN (LH): CPT

## 2022-09-20 PROCEDURE — 83036 HEMOGLOBIN GLYCOSYLATED A1C: CPT | Performed by: FAMILY MEDICINE

## 2022-09-20 PROCEDURE — 82627 DEHYDROEPIANDROSTERONE: CPT

## 2022-09-20 PROCEDURE — 84403 ASSAY OF TOTAL TESTOSTERONE: CPT

## 2022-09-20 PROCEDURE — 36415 COLL VENOUS BLD VENIPUNCTURE: CPT

## 2022-09-20 PROCEDURE — 82672 ASSAY OF ESTROGEN: CPT

## 2022-09-20 PROCEDURE — 83001 ASSAY OF GONADOTROPIN (FSH): CPT

## 2022-09-21 LAB — DHEA-S SERPL-MCNC: 189 UG/DL (ref 110–431.7)

## 2022-09-23 LAB — ESTROGEN SERPL-MCNC: 66 PG/ML

## 2022-10-10 ENCOUNTER — PATIENT OUTREACH (OUTPATIENT)
Dept: CASE MANAGEMENT | Facility: OTHER | Age: 22
End: 2022-10-10

## 2022-10-10 NOTE — OUTREACH NOTE
AMBULATORY CASE MANAGEMENT NOTE    Name and Relationship of Patient/Support Person: Mara Moreno ROBERT -   Patient exhibits strong sense of health self-management and adequate support system.   No issue with social determinants,denies food, medication, transportation, or financial insecurities. Patient has had fatigue and plans to discuss with cardiologist. No further questions or concerns voiced at this time.     Advised pt to call RN-ECM  with any needs.           Education Documentation  No documentation found.        ANA MARIA BELL  Ambulatory Case Management    10/10/2022, 12:26 EDT

## 2023-03-29 ENCOUNTER — TRANSCRIBE ORDERS (OUTPATIENT)
Dept: ADMINISTRATIVE | Facility: HOSPITAL | Age: 23
End: 2023-03-29
Payer: COMMERCIAL

## 2023-03-29 DIAGNOSIS — I49.01 VENTRICULAR FIBRILLATION: Primary | ICD-10-CM

## 2023-04-11 ENCOUNTER — HOSPITAL ENCOUNTER (OUTPATIENT)
Dept: CARDIOLOGY | Facility: HOSPITAL | Age: 23
Discharge: HOME | End: 2023-04-11
Admitting: NURSE PRACTITIONER
Payer: COMMERCIAL

## 2023-04-11 VITALS
HEIGHT: 67 IN | WEIGHT: 226 LBS | SYSTOLIC BLOOD PRESSURE: 135 MMHG | BODY MASS INDEX: 35.47 KG/M2 | DIASTOLIC BLOOD PRESSURE: 74 MMHG

## 2023-04-11 DIAGNOSIS — I49.01 VENTRICULAR FIBRILLATION: ICD-10-CM

## 2023-04-11 LAB
BH CV ECHO MEAS - AO MAX PG: 7.8 MMHG
BH CV ECHO MEAS - AO MEAN PG: 4 MMHG
BH CV ECHO MEAS - AO ROOT DIAM: 2.6 CM
BH CV ECHO MEAS - AO V2 MAX: 140 CM/SEC
BH CV ECHO MEAS - AO V2 VTI: 29.1 CM
BH CV ECHO MEAS - AVA(I,D): 2.42 CM2
BH CV ECHO MEAS - EDV(CUBED): 82.9 ML
BH CV ECHO MEAS - EDV(MOD-SP4): 98.9 ML
BH CV ECHO MEAS - EF(MOD-SP4): 62.6 %
BH CV ECHO MEAS - ESV(CUBED): 19.5 ML
BH CV ECHO MEAS - ESV(MOD-SP4): 37 ML
BH CV ECHO MEAS - FS: 38.3 %
BH CV ECHO MEAS - IVS/LVPW: 0.97 CM
BH CV ECHO MEAS - IVSD: 1.03 CM
BH CV ECHO MEAS - LA DIMENSION: 3.5 CM
BH CV ECHO MEAS - LAT PEAK E' VEL: 16.9 CM/SEC
BH CV ECHO MEAS - LV DIASTOLIC VOL/BSA (35-75): 46.4 CM2
BH CV ECHO MEAS - LV MASS(C)D: 154.9 GRAMS
BH CV ECHO MEAS - LV MAX PG: 4.5 MMHG
BH CV ECHO MEAS - LV MEAN PG: 2 MMHG
BH CV ECHO MEAS - LV SYSTOLIC VOL/BSA (12-30): 17.4 CM2
BH CV ECHO MEAS - LV V1 MAX: 106 CM/SEC
BH CV ECHO MEAS - LV V1 VTI: 22.4 CM
BH CV ECHO MEAS - LVIDD: 4.4 CM
BH CV ECHO MEAS - LVIDS: 2.7 CM
BH CV ECHO MEAS - LVOT AREA: 3.1 CM2
BH CV ECHO MEAS - LVOT DIAM: 2 CM
BH CV ECHO MEAS - LVPWD: 1.06 CM
BH CV ECHO MEAS - MED PEAK E' VEL: 11.6 CM/SEC
BH CV ECHO MEAS - MV A MAX VEL: 54.8 CM/SEC
BH CV ECHO MEAS - MV DEC TIME: 0.17 MSEC
BH CV ECHO MEAS - MV E MAX VEL: 102 CM/SEC
BH CV ECHO MEAS - MV E/A: 1.86
BH CV ECHO MEAS - RAP SYSTOLE: 5 MMHG
BH CV ECHO MEAS - RVSP: 14.9 MMHG
BH CV ECHO MEAS - SI(MOD-SP4): 29.1 ML/M2
BH CV ECHO MEAS - SV(LVOT): 70.4 ML
BH CV ECHO MEAS - SV(MOD-SP4): 61.9 ML
BH CV ECHO MEAS - TR MAX PG: 9.9 MMHG
BH CV ECHO MEAS - TR MAX VEL: 157 CM/SEC
BH CV ECHO MEASUREMENTS AVERAGE E/E' RATIO: 7.16
LEFT ATRIUM VOLUME INDEX: 22.4 ML/M2
LEFT ATRIUM VOLUME: 47.7 ML
MAXIMAL PREDICTED HEART RATE: 197 BPM
STRESS TARGET HR: 167 BPM

## 2023-04-11 PROCEDURE — 93306 TTE W/DOPPLER COMPLETE: CPT

## 2023-04-11 PROCEDURE — 93306 TTE W/DOPPLER COMPLETE: CPT | Performed by: EMERGENCY MEDICINE

## 2023-05-18 ENCOUNTER — TRANSCRIBE ORDERS (OUTPATIENT)
Dept: ADMINISTRATIVE | Facility: HOSPITAL | Age: 23
End: 2023-05-18
Payer: COMMERCIAL

## 2023-05-18 DIAGNOSIS — E28.2 POLYCYSTIC OVARIES: Primary | ICD-10-CM

## 2023-05-18 DIAGNOSIS — E66.01 MORBID OBESITY: ICD-10-CM

## 2023-06-08 ENCOUNTER — TELEPHONE (OUTPATIENT)
Dept: OBSTETRICS AND GYNECOLOGY | Facility: CLINIC | Age: 23
End: 2023-06-08
Payer: COMMERCIAL

## 2023-06-08 ENCOUNTER — HOSPITAL ENCOUNTER (EMERGENCY)
Facility: HOSPITAL | Age: 23
Discharge: HOME | End: 2023-06-08
Payer: COMMERCIAL

## 2023-06-08 ENCOUNTER — APPOINTMENT (OUTPATIENT)
Dept: ULTRASOUND IMAGING | Facility: HOSPITAL | Age: 23
End: 2023-06-08
Payer: COMMERCIAL

## 2023-06-08 VITALS
BODY MASS INDEX: 39.21 KG/M2 | HEART RATE: 98 BPM | HEIGHT: 66 IN | OXYGEN SATURATION: 98 % | RESPIRATION RATE: 20 BRPM | WEIGHT: 244 LBS | TEMPERATURE: 98.4 F | SYSTOLIC BLOOD PRESSURE: 132 MMHG | DIASTOLIC BLOOD PRESSURE: 86 MMHG

## 2023-06-08 DIAGNOSIS — O20.9 VAGINAL BLEEDING AFFECTING EARLY PREGNANCY: ICD-10-CM

## 2023-06-08 DIAGNOSIS — O02.81 ELEVATED LEVEL OF QUANTITATIVE HCG FOR GESTATIONAL AGE IN EARLY PREGNANCY: Primary | ICD-10-CM

## 2023-06-08 LAB
ABO GROUP BLD: NORMAL
ALBUMIN SERPL-MCNC: 4.4 G/DL (ref 3.5–5.2)
ALBUMIN/GLOB SERPL: 1.5 G/DL
ALP SERPL-CCNC: 84 U/L (ref 39–117)
ALT SERPL W P-5'-P-CCNC: 46 U/L (ref 1–33)
ANION GAP SERPL CALCULATED.3IONS-SCNC: 9 MMOL/L (ref 5–15)
AST SERPL-CCNC: 32 U/L (ref 1–32)
BACTERIA UR QL AUTO: ABNORMAL /HPF
BASOPHILS # BLD AUTO: 0.05 10*3/MM3 (ref 0–0.2)
BASOPHILS NFR BLD AUTO: 0.5 % (ref 0–1.5)
BILIRUB SERPL-MCNC: 0.4 MG/DL (ref 0–1.2)
BILIRUB UR QL STRIP: NEGATIVE
BLD GP AB SCN SERPL QL: NEGATIVE
BUN SERPL-MCNC: 6 MG/DL (ref 6–20)
BUN/CREAT SERPL: 13.3 (ref 7–25)
CALCIUM SPEC-SCNC: 10 MG/DL (ref 8.6–10.5)
CHLORIDE SERPL-SCNC: 105 MMOL/L (ref 98–107)
CLARITY UR: CLEAR
CO2 SERPL-SCNC: 23 MMOL/L (ref 22–29)
COLOR UR: YELLOW
CREAT SERPL-MCNC: 0.45 MG/DL (ref 0.57–1)
DEPRECATED RDW RBC AUTO: 39.9 FL (ref 37–54)
EGFRCR SERPLBLD CKD-EPI 2021: 138.8 ML/MIN/1.73
EOSINOPHIL # BLD AUTO: 0.14 10*3/MM3 (ref 0–0.4)
EOSINOPHIL NFR BLD AUTO: 1.5 % (ref 0.3–6.2)
ERYTHROCYTE [DISTWIDTH] IN BLOOD BY AUTOMATED COUNT: 12.4 % (ref 12.3–15.4)
GLOBULIN UR ELPH-MCNC: 3 GM/DL
GLUCOSE SERPL-MCNC: 76 MG/DL (ref 65–99)
GLUCOSE UR STRIP-MCNC: NEGATIVE MG/DL
HCG INTACT+B SERPL-ACNC: 884.1 MIU/ML
HCT VFR BLD AUTO: 41.5 % (ref 34–46.6)
HGB BLD-MCNC: 13.2 G/DL (ref 12–15.9)
HGB UR QL STRIP.AUTO: ABNORMAL
HYALINE CASTS UR QL AUTO: ABNORMAL /LPF
INR PPP: 0.89 (ref 0.91–1.09)
KETONES UR QL STRIP: NEGATIVE
LEUKOCYTE ESTERASE UR QL STRIP.AUTO: ABNORMAL
LIPASE SERPL-CCNC: 29 U/L (ref 13–60)
LYMPHOCYTES # BLD AUTO: 2.37 10*3/MM3 (ref 0.7–3.1)
LYMPHOCYTES NFR BLD AUTO: 25.4 % (ref 19.6–45.3)
MAGNESIUM SERPL-MCNC: 2.1 MG/DL (ref 1.6–2.6)
MCH RBC QN AUTO: 27.9 PG (ref 26.6–33)
MCHC RBC AUTO-ENTMCNC: 31.8 G/DL (ref 31.5–35.7)
MCV RBC AUTO: 87.7 FL (ref 79–97)
MONOCYTES # BLD AUTO: 0.52 10*3/MM3 (ref 0.1–0.9)
MONOCYTES NFR BLD AUTO: 5.6 % (ref 5–12)
NEUTROPHILS NFR BLD AUTO: 6.2 10*3/MM3 (ref 1.7–7)
NEUTROPHILS NFR BLD AUTO: 66.6 % (ref 42.7–76)
NITRITE UR QL STRIP: NEGATIVE
NUMBER OF DOSES: NORMAL
PH UR STRIP.AUTO: 6.5 [PH] (ref 5–8)
PLATELET # BLD AUTO: 242 10*3/MM3 (ref 140–450)
PMV BLD AUTO: 9.5 FL (ref 6–12)
POTASSIUM SERPL-SCNC: 4.3 MMOL/L (ref 3.5–5.2)
PROT SERPL-MCNC: 7.4 G/DL (ref 6–8.5)
PROT UR QL STRIP: NEGATIVE
PROTHROMBIN TIME: 12.1 SECONDS (ref 11.8–14.8)
RBC # BLD AUTO: 4.73 10*6/MM3 (ref 3.77–5.28)
RBC # UR STRIP: ABNORMAL /HPF
REF LAB TEST METHOD: ABNORMAL
RH BLD: POSITIVE
SODIUM SERPL-SCNC: 137 MMOL/L (ref 136–145)
SP GR UR STRIP: 1.01 (ref 1–1.03)
SQUAMOUS #/AREA URNS HPF: ABNORMAL /HPF
UROBILINOGEN UR QL STRIP: ABNORMAL
WBC # UR STRIP: ABNORMAL /HPF
WBC NRBC COR # BLD: 9.32 10*3/MM3 (ref 3.4–10.8)

## 2023-06-08 PROCEDURE — 99283 EMERGENCY DEPT VISIT LOW MDM: CPT

## 2023-06-08 PROCEDURE — 86901 BLOOD TYPING SEROLOGIC RH(D): CPT

## 2023-06-08 PROCEDURE — 83735 ASSAY OF MAGNESIUM: CPT

## 2023-06-08 PROCEDURE — 76817 TRANSVAGINAL US OBSTETRIC: CPT

## 2023-06-08 PROCEDURE — 86900 BLOOD TYPING SEROLOGIC ABO: CPT

## 2023-06-08 PROCEDURE — 80053 COMPREHEN METABOLIC PANEL: CPT

## 2023-06-08 PROCEDURE — 85610 PROTHROMBIN TIME: CPT

## 2023-06-08 PROCEDURE — 86850 RBC ANTIBODY SCREEN: CPT

## 2023-06-08 PROCEDURE — 87086 URINE CULTURE/COLONY COUNT: CPT

## 2023-06-08 PROCEDURE — 83690 ASSAY OF LIPASE: CPT

## 2023-06-08 PROCEDURE — 36415 COLL VENOUS BLD VENIPUNCTURE: CPT

## 2023-06-08 PROCEDURE — 81001 URINALYSIS AUTO W/SCOPE: CPT

## 2023-06-08 PROCEDURE — 84702 CHORIONIC GONADOTROPIN TEST: CPT

## 2023-06-08 PROCEDURE — 85025 COMPLETE CBC W/AUTO DIFF WBC: CPT

## 2023-06-08 NOTE — TELEPHONE ENCOUNTER
Pt called to f/u after going to ED today.  I spoke with Zulay regarding her and Zulay spoke with pt and informed her that if her bleeding worsens or pain occurs to come in to [facility] ED again but she just needed to f/u as scheduled next Wednesday as tomorrow is too soon to repeat hCG, pt voiced understanding.

## 2023-06-08 NOTE — DISCHARGE INSTRUCTIONS
It was very nice to meet you, Mara. Thank you for allowing us to take care of you today at [facility].    Your evaluation today did not show any emergent findings or have any emergent indications for admission to the hospital.     Please understand that an ER evaluation is just the start of your evaluation. We will do what we can, but we are often unable to fully figure out what is causing your symptoms from one evaluation. Thus, our primary goal is to determine whether you need to be evaluated in the hospital or if it is safe for you to go home and see other doctors such as a primary care physician or a specialist on an outpatient basis.     Like we discussed, it is VERY IMPORTANT that you follow up with your primary care doctor as well as your OB/GYN (call them to set up an appointment) within the next few days or as soon as possible so that you can be re-evaluated for improvement in your symptoms or for any other questions.  I have provided both providers office addresses as well as her phone numbers for you in your discharge paperwork.  Please remember to speak with OB/GYN about 48 hours serial hCG readings.    A copy of your results should be included in your paperwork.     Please return to the emergency room within 12-48 hours if you experience fever, chills, chest pain or shortness of breath, pain with inspiration/expiration, pain that travels to your arms, neck or back, nausea, vomiting, severe headache, tearing pain in your chest, dizziness, feel as though you are about to pass out, have any worsening symptoms, or any other concerns.

## 2023-06-08 NOTE — TELEPHONE ENCOUNTER
Provider: ELLIOT VILLA  Caller: ANTHONY BURNETTE  Relationship to Patient: SELF  Pharmacy: [facility]   Phone Number: [phone]  Reason for Call: PT STARTED BLEEDING ON 5/29/23 - LIGHT BROWN/RED VERY LIGHT FLOW -  (PT USED TAMPONS BC SHE DIDN'T KNOW SHE WAS PREGNANT UNTIL 6/7/23)THAT CONTINUED UNTIL 6/2 AND THEN IT WAS JUST SPOTTING - NOW IT IS WHEN SHE WIPES AND LIGHT PINK/LIGHT RED BUT IT HAS RAISED A CONCERN FOR THE PT PT IS REQUESTING TO HAVE AN U/S TODAY PLEASE CALL PT TO ADVISE   When was the patient last seen: 6/16/22

## 2023-06-08 NOTE — Clinical Note
[facility] EMERGENCY DEPARTMENT  [facility] [facility] [address]  Phone: [phone]    Mara Moreno was seen and treated in our emergency department on 6/8/2023.  She may return to work on 06/12/2023.         Thank you for choosing [facility].    Reno Meredith, APRN

## 2023-06-08 NOTE — ED PROVIDER NOTES
Subjective   History of Present Illness  Patient is a 23-year-old female that presents to the emergency department today for vaginal bleeding.  Patient states that her last menstrual cycle started on 4/19/2023 and ended on 4/27/2023.  Patient reports that menstrual cycle was heavier than her usual cycle.  Patient states that she has been spotting light pink to brown, only when wiping since 5/29/2023.  Patient states that she has also had some minor pelvic cramping during this period.  She states that starting on 6/1/2023 she started noticing vaginal discharge turning more of a brown color.  She states that she woke up this morning and the discharge has changed to a light pink to clear color.  Patient states that throughout this time she has been having minor cramping that has been worse with eating and relieved with bowel movements.  Patient states that she had a positive home pregnancy test yesterday and then followed up with her primary care provider at [facility] yesterday.  She states that she again had a positive pregnancy test yesterday, blood work was sent off but she has not received any results.  Patient states that she originally had a OB appointment at the end of June to further evaluate patient for PCOS.  Patient states that she is on Wegovy for this but was discontinued as well as her Celexa yesterday by her primary care provider.  Patient states that she was placed on Zoloft but has not started this yet.  Patient also takes metoprolol due to cardiac issues.  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  Patient states that Dr. Barragan at [facility] in [place] placed a defibrillator on 10/28/2021.  Patient states that she has not had any new cardiac issues since this incident.  Patient denies any chest pain, shortness of breath, abdominal pain, nausea, vomiting or diarrhea.  Patient states that she has been  nauseous over the last few days with an episode of vomiting on Monday.  She states that she has not had any episodes since then.  Patient denies any urinary urgency, frequency, retention, or dysuria.  She denies any abdominal pain currently she states she has had intermittent pelvic cramping but is currently in no pain.    Review of Systems   Genitourinary: Positive for pelvic pain and vaginal discharge.        Patient reports LMP from 4/19/23-4/27/23. States this cycle was heavier than usual. Reports intermittent light spotting when wiping starting on 5/29/23. Has had 2 positive pregnancy tests. 1 at home, 1 at her PCP office.    All other systems reviewed and are negative.      Past Medical History:   Diagnosis Date   • Anxiety    • Fracture of 5th metatarsal     Left   • Hypertension    • PONV (postoperative nausea and vomiting)        Allergies   Allergen Reactions   • Cefdinir Hives and Itching   • Amoxicillin Rash   • Penicillins Swelling and Rash       Past Surgical History:   Procedure Laterality Date   • CARDIAC DEFIBRILLATOR PLACEMENT     • EYE SURGERY      15 months of age   • TOE PERCUTANEOUS PINNING Left 07/18/2018    Procedure: PERCUTANEOUS SCREW FIXATION LEFT 5TH METATARSAL BASE;  Surgeon: Joseph Watkins MD;  Location: [facility] OR;  Service: Orthopedics   • TONSILLECTOMY         Family History   Problem Relation Age of Onset   • Hypertension Father    • Breast cancer Maternal Grandmother    • Ovarian cancer Neg Hx    • Uterine cancer Neg Hx    • Melanoma Neg Hx    • Colon cancer Neg Hx        Social History     Socioeconomic History   • Marital status:    Tobacco Use   • Smoking status: Never   • Smokeless tobacco: Never   Substance and Sexual Activity   • Alcohol use: Yes   • Drug use: No   • Sexual activity: Not Currently     Partners: Male     Birth control/protection: None           Objective   Physical Exam  Vitals and nursing note reviewed.   Constitutional:       General: She is not  in acute distress.     Appearance: Normal appearance. She is not ill-appearing or toxic-appearing.   HENT:      Head: Normocephalic and atraumatic.      Right Ear: External ear normal.      Left Ear: External ear normal.      Nose: Nose normal.      Mouth/Throat:      Mouth: Mucous membranes are moist.      Pharynx: Oropharynx is clear.   Eyes:      Extraocular Movements: Extraocular movements intact.      Conjunctiva/sclera: Conjunctivae normal.      Pupils: Pupils are equal, round, and reactive to light.   Cardiovascular:      Rate and Rhythm: Normal rate and regular rhythm.      Pulses: Normal pulses.      Heart sounds: Normal heart sounds.   Pulmonary:      Effort: Pulmonary effort is normal.      Breath sounds: Normal breath sounds.   Abdominal:      General: Bowel sounds are normal. There is no distension.      Palpations: Abdomen is soft.      Tenderness: There is no abdominal tenderness. There is no guarding or rebound.   Musculoskeletal:         General: Normal range of motion.      Cervical back: Normal range of motion and neck supple.      Right lower leg: No edema.      Left lower leg: No edema.   Skin:     General: Skin is warm and dry.      Capillary Refill: Capillary refill takes less than 2 seconds.   Neurological:      General: No focal deficit present.      Mental Status: She is alert and oriented to person, place, and time.   Psychiatric:         Mood and Affect: Mood normal.         Behavior: Behavior normal.         Thought Content: Thought content normal.         Judgment: Judgment normal.         Procedures           ED Course  ED Course as of 06/09/23 1329   Thu Jun 08, 2023   1355 Urinalysis reveals RBC 3-5, WBC 0-2, trace bacteria, squamous epithelium cells 3-6.  Urinalysis reveals 2+ blood and 1+ leukocytes.  Patient is type A positive, RhoGAM is not indicated.  hCG quantitative is 884.10.  Magnesium, lipase, CBC and CMP unremarkable.  PT/INR unremarkable. [KF]   1357 Ultrasound reveals no  intrauterine pregnancy demonstrated.  This may be secondary to early pregnancy outside of the discriminatory ability of ultrasound, nonviable pregnancy, recent spontaneous miscarriage, or ectopic pregnancy.  Recommended close clinical follow-up with repeat imaging as clinically indicated.  This was reviewed and interpreted by Dr. Ericka Akers. [KF]   1400 Patient who is positive with a sonogram which does not show any intrauterine pregnancy Case discussed with the nurse practitioner.  The patient will have a very close follow-up with a gynecologist repeat pregnancy test levels and repeat sonogram.  The possibly ectopic Rynesa cannot be ruled out in this case. [TS]   1407 Discussed with patient the need for serial hCG levels in 48 hours.  Instructed patient to call OB office as soon as she is discharged from the ED to schedule an appointment for this.  I also discussed the risks of ectopic pregnancy and had an in-depth discussion with patient regarding strict return precautions to the ED.  Patient voices understanding. [KF]      ED Course User Index  [KF] Reno Meredith, APRADELE  [TS] Landon Walker MD                                           Medical Decision Making  Mara Moreno is a 23 y.o. female who presents to the ED for vaginal bleeding.  Patient states that her last menstrual cycle started on 4/19/2023 and ended on 4/27/2023.  Patient reports that menstrual cycle was heavier than her usual cycle.  Patient states that she has been spotting light pink to brown, only when wiping since 5/29/2023.  Patient states that she has also had some minor pelvic cramping during this period.  She states that starting on 6/1/2023 she started noticing vaginal discharge turning more of a brown color.  She states that she woke up this morning and the discharge has changed to a light pink to clear color.  Patient states that throughout this time she has been having minor cramping that has been worse with eating and relieved  with bowel movements.  Patient states that she had a positive home pregnancy test yesterday and then followed up with her primary care provider at [facility] yesterday.  She states that she again had a positive pregnancy test yesterday, blood work was sent off but she has not received any results.  Patient states that she originally had a OB appointment at the end of June to further evaluate patient for PCOS.  Patient states that she is on Wegovy for this but was discontinued as well as her Celexa yesterday by her primary care provider.  Patient states that she was placed on Zoloft but has not started this yet.  Patient also takes metoprolol due to cardiac issues.  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  Patient states that Dr. Barragan at [facility] in [place] placed a defibrillator on 10/28/2021.  Patient states that she has not had any new cardiac issues since this incident.  Patient denies any chest pain, shortness of breath, abdominal pain, nausea, vomiting or diarrhea.  Patient states that she has been nauseous over the last few days with an episode of vomiting on Monday.  She states that she has not had any episodes since then.  Patient denies any urinary urgency, frequency, retention, or dysuria.  She denies any abdominal pain currently she states she has had intermittent pelvic cramping but is currently in no pain.    Patient was non-toxic appearing on arrival. No acute distress was noted. Vital signs are stable. PMH, SH, medication regimen and more reviewed.     Patient's presentation raises suspicion for differentials including, but not limited to, vaginal bleeding during pregnancy, threatened miscarriage, ectopic pregnancy.      Given this, Mara was placed on the monitor and IV access was obtained as needed. Laboratory studies and imaging studies were ordered.     Please refer to ED course for lab and imaging  results.     Given findings described above, patient's presentation is likely consistent with elevated HCG in the absence of IUP noted on US imaging.     I had an in depth discussion with the patient as well as family members at bedside regarding elevated HCG levels in the absence of an IUP noted on her ultrasound. I informed the patient that this could indicate several different causes such as that she is very early in her pregnancy, possible ectopic pregnancy, or a possible miscarriage. Because of this, I stressed the importance of the patient calling her OBGYN upon discharge from the ED to schedule an appointment for serial HCG and ultrasounds and further follow up. I answered all the questions regarding the emergency department evaluation, diagnosis, and treatment plan in plain and simple language that was understandable. I said that there is always some diagnostic uncertainty in the ER and went over the fact that the symptoms may change or new symptoms may reveal themselves after being discharged. Because of this, I said that it is VERY IMPORTANT that Mara follows up, by calling as soon as possible to set up an appointment, with her OBGYN within the next few days or as soon as reasonably possible so that the symptoms can be re-evaluated for improvement or for any other questions. I also gave Mara common sense return precautions and encouraged a quick return to the emergency department within 24 - 48hrs if there are any new, worsening, or concerning symptoms. The patient verbalized understanding of the discharge instructions and agreed with them. Mara was discharged in stable condition and was observed ambulating out of the ER.    Problems Addressed:  Elevated level of quantitative hCG for gestational age in early pregnancy: complicated acute illness or injury  Vaginal bleeding affecting early pregnancy: complicated acute illness or injury    Amount and/or Complexity of Data Reviewed  Labs:  ordered.  Radiology: ordered.          Final diagnoses:   Elevated level of quantitative hCG for gestational age in early pregnancy   Vaginal bleeding affecting early pregnancy       ED Disposition  ED Disposition     ED Disposition   Discharge    Condition   Stable    Comment   --             Taylor Wan PA  [address] [id number] [phone]    Schedule an appointment as soon as possible for a visit       Yves Dsouza MD  [address] [id number] [phone]    Schedule an appointment as soon as possible for a visit   Please call and schedule appointment for 48 hours serial hCG testing.    [facility] Emergency Department  [address] [place] [address] [phone]    If symptoms worsen         Medication List      No changes were made to your prescriptions during this visit.            Reno Meredith, APRN  06/09/23 2714

## 2023-06-08 NOTE — TELEPHONE ENCOUNTER
Pt's PCP called office to inquire why pt's new OB visit is not scheduled for tomorrow.  Office updated that pt was seen in ER today and US and HCG stable, no need to repeat as soon as tomorrow.  When I spoke with pt on the phone, pt denies bleeding or cramping.  Pt reports light pink spotting that was resolved by time of ER visit today.  Pt advised with worsening symptoms prior to next weeks OB appt, to go to ER.  Pt voiced understanding to all conversation today.  Dr Marroquin's office staff also voiced understanding to all conversation.  Pt has OB appt with an US on 6/14/23.

## 2023-06-08 NOTE — TELEPHONE ENCOUNTER
I spoke with pt by phone. Pt states that she is still having light pink spotting and is scared that she may be miscarrying. Pt LMP 4/19/2023. Pt denies cramping. Pt does report that she had confirmation of pregnancy at PCP yesterday 6/7/2023. Pt is upset on phone. Per Zulay RODRIGUEZ, pt will need to go to ER to be evaluated for vaginal bleeding in early pregnancy. Pt agrees and voices understanding.

## 2023-06-10 ENCOUNTER — TRANSCRIBE ORDERS (OUTPATIENT)
Dept: LAB | Facility: HOSPITAL | Age: 23
End: 2023-06-10
Payer: COMMERCIAL

## 2023-06-10 ENCOUNTER — LAB (OUTPATIENT)
Dept: LAB | Facility: HOSPITAL | Age: 23
End: 2023-06-10
Payer: COMMERCIAL

## 2023-06-10 DIAGNOSIS — Z33.1 PREGNANCY, INCIDENTAL: Primary | ICD-10-CM

## 2023-06-10 DIAGNOSIS — Z33.1 PREGNANCY, INCIDENTAL: ICD-10-CM

## 2023-06-10 LAB
BACTERIA SPEC AEROBE CULT: NO GROWTH
HCG INTACT+B SERPL-ACNC: 862 MIU/ML
PROGEST SERPL-MCNC: 2.42 NG/ML

## 2023-06-10 PROCEDURE — 84144 ASSAY OF PROGESTERONE: CPT

## 2023-06-10 PROCEDURE — 84702 CHORIONIC GONADOTROPIN TEST: CPT

## 2023-06-10 PROCEDURE — 36415 COLL VENOUS BLD VENIPUNCTURE: CPT

## 2023-06-12 ENCOUNTER — LAB (OUTPATIENT)
Dept: LAB | Facility: HOSPITAL | Age: 23
End: 2023-06-12
Payer: COMMERCIAL

## 2023-06-12 ENCOUNTER — OFFICE VISIT (OUTPATIENT)
Dept: OBSTETRICS AND GYNECOLOGY | Facility: CLINIC | Age: 23
End: 2023-06-12
Payer: COMMERCIAL

## 2023-06-12 ENCOUNTER — TELEPHONE (OUTPATIENT)
Dept: OBSTETRICS AND GYNECOLOGY | Facility: CLINIC | Age: 23
End: 2023-06-12
Payer: COMMERCIAL

## 2023-06-12 ENCOUNTER — TELEPHONE (OUTPATIENT)
Dept: OBSTETRICS AND GYNECOLOGY | Facility: CLINIC | Age: 23
End: 2023-06-12

## 2023-06-12 VITALS
BODY MASS INDEX: 38.09 KG/M2 | SYSTOLIC BLOOD PRESSURE: 124 MMHG | DIASTOLIC BLOOD PRESSURE: 84 MMHG | WEIGHT: 237 LBS | HEIGHT: 66 IN

## 2023-06-12 DIAGNOSIS — O03.9 MISCARRIAGE: Primary | ICD-10-CM

## 2023-06-12 DIAGNOSIS — O20.0 THREATENED MISCARRIAGE IN EARLY PREGNANCY: Primary | ICD-10-CM

## 2023-06-12 LAB — HCG INTACT+B SERPL-ACNC: 442.7 MIU/ML

## 2023-06-12 PROCEDURE — 84702 CHORIONIC GONADOTROPIN TEST: CPT | Performed by: OBSTETRICS & GYNECOLOGY

## 2023-06-12 PROCEDURE — 99214 OFFICE O/P EST MOD 30 MIN: CPT | Performed by: OBSTETRICS & GYNECOLOGY

## 2023-06-12 PROCEDURE — 36415 COLL VENOUS BLD VENIPUNCTURE: CPT | Performed by: OBSTETRICS & GYNECOLOGY

## 2023-06-12 RX ORDER — MISOPROSTOL 200 UG/1
600 TABLET ORAL ONCE
Qty: 3 TABLET | Refills: 1 | Status: SHIPPED | OUTPATIENT
Start: 2023-06-12 | End: 2023-06-12

## 2023-06-12 RX ORDER — SERTRALINE HYDROCHLORIDE 100 MG/1
TABLET, FILM COATED ORAL
COMMUNITY

## 2023-06-12 RX ORDER — TRAMADOL HYDROCHLORIDE 50 MG/1
50 TABLET ORAL EVERY 6 HOURS PRN
Qty: 12 TABLET | Refills: 0 | Status: SHIPPED | OUTPATIENT
Start: 2023-06-12

## 2023-06-12 NOTE — PROGRESS NOTES
Faizan Katz MD  [facility]  [address]  [place] [address]  Office [phone]  Fax [phone]      [facility]  Mara Moreno  : 2000  MRN: [id number]    Subjective   Subjective     Chief Complaint   Patient presents with    Miscarriage     Patient states pain has been pretty severe today. Patient states heating pad and ibuprofen not helping much. Patient states her bleeding today has been moderate. Patient reports bleeding started May 29th- on toilet tissue. Patient noticed in underwear Saturday- small clots since then. Patient has concerns about future pregnancies an progesterone levels.        History of Present Illness  Mara Moreno is a 23 y.o. female , , who comes to the office today for consult on threatened miscarriage. Went to the emergency department on  secondary to vaginal bleeding.  LMP 2023.  Also home pregnancy test on .  Her history significant for cardiac history.  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  She received a defibrillator on 2021.  She has not had any new cardiac issues since this incident. Reports the above. Passed a purple clot earlier today - showed myself picture of this. Bleeding not to bad - not soaking pads/tampons. Cramping - using ibuprofen and tylenol. Better currently. Was bad earlier today prior to passage of purple clot.     Review of Systems   Genitourinary:  Positive for pelvic pain and vaginal bleeding. Negative for decreased urine volume, difficulty urinating, dyspareunia, dysuria, enuresis, flank pain, frequency, genital sores, hematuria, menstrual problem, urgency, vaginal discharge and vaginal pain.   All other systems reviewed and are negative.     The following portions of the patient's history were reviewed and updated as appropriate: allergies, current medications, past family history, past medical history, past social history, past surgical history,  "and problem list.    Objective    Objective     Vitals:   Visit Vitals  /84   Ht 167.6 cm (66\")   Wt 108 kg (237 lb)   LMP 2023   BMI 38.25 kg/m²        Physical Exam  Vitals and nursing note reviewed.   Constitutional:       General: She is not in acute distress.     Appearance: Normal appearance. She is not ill-appearing.   HENT:      Head: Normocephalic and atraumatic.      Nose: No congestion or rhinorrhea.   Eyes:      General: No scleral icterus.        Right eye: No discharge.         Left eye: No discharge.      Extraocular Movements: Extraocular movements intact.      Conjunctiva/sclera: Conjunctivae normal.   Pulmonary:      Effort: Pulmonary effort is normal. No accessory muscle usage or respiratory distress.   Abdominal:      General: Abdomen is flat. There is no distension.      Palpations: Abdomen is soft.      Tenderness: There is no abdominal tenderness. There is no guarding or rebound.   Musculoskeletal:      Right lower leg: No edema.      Left lower leg: No edema.   Skin:     General: Skin is warm and dry.      Coloration: Skin is not ashen, cyanotic or jaundiced.   Neurological:      General: No focal deficit present.      Mental Status: She is alert and oriented to person, place, and time.   Psychiatric:         Mood and Affect: Mood normal.         Behavior: Behavior is cooperative.       Result Review    Progesterone (06/10/2023 13:14) = 2.4  hCG, Quantitative, Pregnancy (06/10/2023 13:14) 862  hCG, Quantitative, Pregnancy (2023 12:22) 884  CBC & Differential (2023 12:22) WNL   RhIg Evaluation (2023 12:22) A positive  Doses of Rh Immune Globulin (2023 12:22)    US Ob Transvaginal (2023 13:11)   IMPRESSION:  No intrauterine pregnancy demonstrated. This may be secondary to early  pregnancy outside the discriminatory capability of ultrasound, nonviable  pregnancy, recent spontaneous miscarriage, or ectopic pregnancy.  Recommend close clinical " follow-up with repeat imaging as clinically  indicated.        Assessment & Plan   Assessment / Plan     Diagnoses and all orders for this visit:    1. Threatened miscarriage in early pregnancy (Primary)  -     HCG, Quantitative, Pregnancy (Hospital Lab Only)        Discussion: Ultrasound and labs reviewed with patient. With incomplete miscarriage clinically. Options discussed including expectant management, medical management, and surgical management. Each discussed in detail including time frames for completions (I.e. surgery fastest, medical within a week, expectant management within 8 weeks). Discussed success rates for each: 70-80% for expectant, 90% medical, 99% surgical. Complications of each discussed including possible need for surgical intervention following expectant or medical management. Risks of bleeding and infection discussed. Discussed risks of surgery from D&C including risks of uterine perforation. She expressed understanding of the above.  Recheck beta-hCG today. Patient desires medical management. Plan for cytotec pending hcg today. RTC 1 week for recheck. Bleeding precautions reviewed. We did discuss recurrent miscarriage and progesterone supplementation. Discussed in general about low change of recurrent miscarriage. Reviewed risks and benefits of supplemental progesterone and the current literature for and against it. Discussed that we can consider this for her next pregnancy if she wishes.       Follow-up: Return in about 1 week (around 6/19/2023) for GYN f/u.    Faizan Katz MD

## 2023-06-12 NOTE — TELEPHONE ENCOUNTER
Pt called to report heavier period like bleeding and cramping.  Pt had HCG drawn over the weekend and it did decrease.  Pt has appt 6/14/23, appt changed to GYN f/u for MAB.  Pt advised with worsening symptoms to go to   ER.  Pt voiced understanding.

## 2023-06-12 NOTE — TELEPHONE ENCOUNTER
Pt's mother works at the pt's PCP office.  Office called Dr Katz and asked for an appt today.  Dr Katz advised to add pt to the schedule today to be seen after his surgeries.  Discussed with Sheridan RODRIGUEZ and pt added to 3pm spot today.  Dr Katz advised that no US is needed.  Called and updated pt on appt time and no US for today.  Pt voiced understanding.

## 2023-06-12 NOTE — TELEPHONE ENCOUNTER
Called patient to update her on recent beta-HCG level. Hcg has dropped as anticipated. Patient still wishes to proceed with medical management. Discussed that as she has already passed some products of conception, that she may not have any more bleeding or passage of clots despite cytotec. She expressed understanding of this. ED precautions noted.

## 2023-06-19 ENCOUNTER — OFFICE VISIT (OUTPATIENT)
Dept: OBSTETRICS AND GYNECOLOGY | Facility: CLINIC | Age: 23
End: 2023-06-19
Payer: COMMERCIAL

## 2023-06-19 VITALS
WEIGHT: 242.2 LBS | BODY MASS INDEX: 38.92 KG/M2 | SYSTOLIC BLOOD PRESSURE: 124 MMHG | DIASTOLIC BLOOD PRESSURE: 74 MMHG | HEIGHT: 66 IN

## 2023-06-19 DIAGNOSIS — O03.9 FOLLOW-UP VISIT AFTER MISCARRIAGE: Primary | ICD-10-CM

## 2023-06-19 DIAGNOSIS — Z51.89 FOLLOW-UP VISIT AFTER MISCARRIAGE: Primary | ICD-10-CM

## 2023-06-19 PROCEDURE — 99212 OFFICE O/P EST SF 10 MIN: CPT | Performed by: OBSTETRICS & GYNECOLOGY

## 2023-06-19 RX ORDER — MISOPROSTOL 200 UG/1
TABLET ORAL
COMMUNITY
Start: 2023-06-12 | End: 2023-06-19

## 2023-06-19 NOTE — PROGRESS NOTES
"    Faizan Katz MD  [facility]  [address]  [place] [address]  Office [phone]  Fax [phone]      [facility]  Mara Moreno  : 2000  MRN: [id number]    Subjective   Subjective     Chief Complaint   Patient presents with    Miscarriage     Patient here for follow up on missed . Patient took cytotec since last visit. Patient had bleeding all week, but states the last few days has gotten lighter. Patient had ultrasound today. No other questions or concerns.        History of Present Illness  Mara Moreno is a 23 y.o. female , , who comes to the office today for follow-up of miscarriage.  Notes the above.  Spotting mainly with wiping today.  No other complaints.    Review of Systems   Genitourinary:  Negative for decreased urine volume, difficulty urinating, dyspareunia, dysuria, enuresis, flank pain, frequency, genital sores, hematuria, menstrual problem, pelvic pain, urgency, vaginal bleeding, vaginal discharge and vaginal pain.   All other systems reviewed and are negative.     The following portions of the patient's history were reviewed and updated as appropriate: allergies, current medications, past family history, past medical history, past social history, past surgical history, and problem list.    Objective    Objective     Vitals:   Visit Vitals  /74   Ht 167.6 cm (66\")   Wt 110 kg (242 lb 3.2 oz)   LMP 2023   BMI 39.09 kg/m²        Physical Exam  Vitals reviewed.   Constitutional:       General: She is not in acute distress.     Appearance: Normal appearance. She is obese. She is not ill-appearing.   HENT:      Head: Normocephalic and atraumatic.      Nose: No congestion or rhinorrhea.   Eyes:      General: No scleral icterus.        Right eye: No discharge.         Left eye: No discharge.      Extraocular Movements: Extraocular movements intact.      Conjunctiva/sclera: Conjunctivae normal.   Pulmonary:      Effort: Pulmonary effort is " normal. No accessory muscle usage or respiratory distress.   Musculoskeletal:      Right lower leg: No edema.      Left lower leg: No edema.   Skin:     General: Skin is warm and dry.      Coloration: Skin is not ashen, cyanotic or jaundiced.   Neurological:      General: No focal deficit present.      Mental Status: She is alert and oriented to person, place, and time.   Psychiatric:         Mood and Affect: Mood normal.         Behavior: Behavior is cooperative.       Result Review    US Ob Transvaginal (06/19/2023 15:14)   Uterus 6.4 cm anteverted  Endometrium 5 mm, thin  No free fluid  Ovaries appear normal        Assessment & Plan   Assessment / Plan     Diagnoses and all orders for this visit:    1. Follow-up visit after miscarriage (Primary)        Discussion: s/p completed miscarriage. Has annual next week.     Follow-up: Return if symptoms worsen or fail to improve, for Next scheduled follow up.    Faizan Katz MD

## 2023-06-27 ENCOUNTER — TELEPHONE (OUTPATIENT)
Dept: OBSTETRICS AND GYNECOLOGY | Facility: CLINIC | Age: 23
End: 2023-06-27

## 2023-06-27 NOTE — TELEPHONE ENCOUNTER
Caller: Mara Moreno    Relationship to patient: Self    Best call back number: [phone]    Patient is needing: PT SICK CALLED AND CANCELED SAME DAY APPT.

## 2023-10-17 ENCOUNTER — OFFICE VISIT (OUTPATIENT)
Dept: OBSTETRICS AND GYNECOLOGY | Facility: CLINIC | Age: 23
End: 2023-10-17
Payer: COMMERCIAL

## 2023-10-17 VITALS
SYSTOLIC BLOOD PRESSURE: 128 MMHG | BODY MASS INDEX: 40.98 KG/M2 | DIASTOLIC BLOOD PRESSURE: 84 MMHG | HEIGHT: 66 IN | WEIGHT: 255 LBS

## 2023-10-17 DIAGNOSIS — E28.2 PCOS (POLYCYSTIC OVARIAN SYNDROME): Primary | ICD-10-CM

## 2023-10-17 DIAGNOSIS — Z95.810 PRESENCE OF AUTOMATIC (IMPLANTABLE) CARDIAC DEFIBRILLATOR: ICD-10-CM

## 2023-10-17 DIAGNOSIS — Z30.09 FAMILY PLANNING: ICD-10-CM

## 2023-10-17 RX ORDER — METFORMIN HYDROCHLORIDE 500 MG/1
500 TABLET, EXTENDED RELEASE ORAL
Qty: 30 TABLET | Refills: 11 | Status: SHIPPED | OUTPATIENT
Start: 2023-10-17

## 2023-10-17 NOTE — PROGRESS NOTES
Faizan Katz MD  [facility] [place] [place] [facility]  [address]  [place] [address]  Office [phone]  Fax [phone]      [facility]  Mara Moreno  : 2000  MRN: [id number]    Subjective   Subjective     Chief Complaint   Patient presents with    Fertility     Patient here to discuss fertility, reports not getting positives on OPKs. Had one a faint positive line on one test, but it was never as dark as the control line. Started spotting yesterday and just had a period two weeks ago. Wanting to discuss possible ovulation induction.       History of Present Illness  Mara Moreno is a 23 y.o. female , , who comes to the office today for fertility discussion. History notable for spontaneous pregnancy with resultant miscarriage in July this year. That was an unplanned pregnancy. Since then, she has been attempting to conceive. She reports that she is having cycles monthly but on OPK testing, she is not getting positive tests. States had a faint positive but nothing else. Patient's last menstrual period was 2023. Had spotting yesterday with menses 2 weeks ago. Having unprotected intercourse. Saw Dr. Barragan, cardiology, recently for follow-up as with h/o ventricular fibrillation and now with cardiac defibrillator in situ. She denies her defibrillator going off since placement. She was changed from metoprolol to nadolol. .      Review of Systems   Genitourinary:  Positive for menstrual problem. Negative for decreased urine volume, difficulty urinating, dyspareunia, dysuria, enuresis, flank pain, frequency, genital sores, hematuria, pelvic pain, urgency, vaginal bleeding, vaginal discharge and vaginal pain.   All other systems reviewed and are negative.       The following portions of the patient's history were reviewed and updated as appropriate: allergies, current medications, past family history, past medical history, past social history, past surgical history, and problem list.    Objective   "  Objective     Vitals:   Visit Vitals  /84   Ht 167.6 cm (66\")   Wt 116 kg (255 lb)   LMP 09/28/2023   Breastfeeding No   BMI 41.16 kg/m²        Physical Exam  Vitals reviewed.   Constitutional:       General: She is not in acute distress.     Appearance: Normal appearance. She is obese. She is not ill-appearing.   HENT:      Head: Normocephalic and atraumatic.      Nose: No congestion or rhinorrhea.   Eyes:      General: No scleral icterus.        Right eye: No discharge.         Left eye: No discharge.      Extraocular Movements: Extraocular movements intact.      Conjunctiva/sclera: Conjunctivae normal.   Pulmonary:      Effort: Pulmonary effort is normal. No accessory muscle usage or respiratory distress.   Musculoskeletal:      Right lower leg: No edema.      Left lower leg: No edema.   Skin:     General: Skin is warm and dry.      Coloration: Skin is not ashen, cyanotic or jaundiced.   Neurological:      General: No focal deficit present.      Mental Status: She is alert and oriented to person, place, and time.   Psychiatric:         Mood and Affect: Mood normal.         Behavior: Behavior is cooperative.         Result Review    Comprehensive Metabolic Panel (06/08/2023 12:22)   Cr 0.45  ALT 46  AST 32        Assessment & Plan   Assessment / Plan     Diagnoses and all orders for this visit:    1. PCOS (polycystic ovarian syndrome) (Primary)  -     Comprehensive Metabolic Panel  -     metFORMIN ER (GLUCOPHAGE-XR) 500 MG 24 hr tablet; Take 1 tablet by mouth Daily With Breakfast.  Dispense: 30 tablet; Refill: 11    2. Family planning    3. Presence of automatic (implantable) cardiac defibrillator  -     Ambulatory Referral to [facility]/Perinatology    4. BMI 40.0-44.9, adult        Discussion:   With h/o PCOS. Can consider metformin. Recheck CMP today. She has a h/o spontaneous pregnancy with recent miscarriage. She reports that she has not had any positive OPKs despite cycle tracking. She is interested in " options. I have recommended prenatal counseling with MFM given her history with defibrillator present. I have recommended working on diet/weight loss, physical activity, mental health, as these can have a role in ovulation. Discussed with PCOS, reduction in weight, 5-10%, over the next several months can have a positive effect on ovulation. Discussed that given the recent miscarriage, she may need more time before ovulation starts again, though PCOS is higher on the differential. As she had a spontaneous pregnancy, I encouraged her that this is reassuring and that she needs to continue trying for now as it has only been 3-4 months since the miscarriage. Discussed ideally, infertility treatment would not begin until she has attempted truly for 12 months. Her questions were answered. She expressed understanding. She is agreeable to starting metformin. Increase as needed gradually.     Follow-up: Return if symptoms worsen or fail to improve.    Faizan Katz MD

## 2023-10-18 LAB
ALBUMIN SERPL-MCNC: 4.6 G/DL (ref 3.5–5.2)
ALBUMIN/GLOB SERPL: 2.2 G/DL
ALP SERPL-CCNC: 86 U/L (ref 39–117)
ALT SERPL-CCNC: 67 U/L (ref 1–33)
AST SERPL-CCNC: 52 U/L (ref 1–32)
BILIRUB SERPL-MCNC: 0.2 MG/DL (ref 0–1.2)
BUN SERPL-MCNC: 8 MG/DL (ref 6–20)
BUN/CREAT SERPL: 11.3 (ref 7–25)
CALCIUM SERPL-MCNC: 9.6 MG/DL (ref 8.6–10.5)
CHLORIDE SERPL-SCNC: 103 MMOL/L (ref 98–107)
CO2 SERPL-SCNC: 28.5 MMOL/L (ref 22–29)
CREAT SERPL-MCNC: 0.71 MG/DL (ref 0.57–1)
EGFRCR SERPLBLD CKD-EPI 2021: 122.7 ML/MIN/1.73
GLOBULIN SER CALC-MCNC: 2.1 GM/DL
GLUCOSE SERPL-MCNC: 121 MG/DL (ref 65–99)
POTASSIUM SERPL-SCNC: 4 MMOL/L (ref 3.5–5.2)
PROT SERPL-MCNC: 6.7 G/DL (ref 6–8.5)
SODIUM SERPL-SCNC: 141 MMOL/L (ref 136–145)

## 2023-10-24 DIAGNOSIS — R79.89 ABNORMAL LFTS (LIVER FUNCTION TESTS): Primary | ICD-10-CM

## 2023-10-24 LAB
HAV IGM SERPL QL IA: NEGATIVE
HBV CORE IGM SERPL QL IA: NEGATIVE
HBV SURFACE AG SERPL QL IA: NEGATIVE
HCV AB SERPL QL IA: NORMAL
HCV IGG SERPL QL IA: NON REACTIVE
WRITTEN AUTHORIZATION: NORMAL

## 2023-11-02 ENCOUNTER — HOSPITAL ENCOUNTER (OUTPATIENT)
Dept: ULTRASOUND IMAGING | Facility: HOSPITAL | Age: 23
Discharge: HOME | End: 2023-11-02
Admitting: OBSTETRICS & GYNECOLOGY
Payer: COMMERCIAL

## 2023-11-02 DIAGNOSIS — R79.89 ABNORMAL LFTS (LIVER FUNCTION TESTS): ICD-10-CM

## 2023-11-02 PROCEDURE — 76705 ECHO EXAM OF ABDOMEN: CPT

## 2024-02-05 ENCOUNTER — OFFICE VISIT (OUTPATIENT)
Dept: OBSTETRICS AND GYNECOLOGY | Age: 24
End: 2024-02-05
Payer: COMMERCIAL

## 2024-02-05 VITALS
HEIGHT: 66 IN | BODY MASS INDEX: 41.33 KG/M2 | WEIGHT: 257.2 LBS | DIASTOLIC BLOOD PRESSURE: 82 MMHG | SYSTOLIC BLOOD PRESSURE: 130 MMHG

## 2024-02-05 DIAGNOSIS — N92.6 IRREGULAR MENSES: ICD-10-CM

## 2024-02-05 DIAGNOSIS — N97.0 INFERTILITY, ANOVULATION: ICD-10-CM

## 2024-02-05 DIAGNOSIS — E28.2 PCOS (POLYCYSTIC OVARIAN SYNDROME): Primary | ICD-10-CM

## 2024-02-05 PROCEDURE — 99214 OFFICE O/P EST MOD 30 MIN: CPT | Performed by: OBSTETRICS & GYNECOLOGY

## 2024-02-05 RX ORDER — METFORMIN HYDROCHLORIDE 500 MG/1
1 TABLET, EXTENDED RELEASE ORAL DAILY
COMMUNITY
Start: 2023-10-17

## 2024-02-05 RX ORDER — VITAMIN B COMPLEX
TABLET ORAL
COMMUNITY

## 2024-02-05 RX ORDER — MEDROXYPROGESTERONE ACETATE 10 MG/1
10 TABLET ORAL DAILY
Qty: 7 TABLET | Refills: 3 | Status: SHIPPED | OUTPATIENT
Start: 2024-02-05

## 2024-02-05 RX ORDER — YOHIMBE BARK 500 MG
CAPSULE ORAL
COMMUNITY

## 2024-02-05 RX ORDER — LETROZOLE 2.5 MG/1
2.5 TABLET, FILM COATED ORAL DAILY
Qty: 5 TABLET | Refills: 3 | Status: SHIPPED | OUTPATIENT
Start: 2024-02-05

## 2024-02-05 NOTE — PROGRESS NOTES
"    Faizan Katz MD  [facility]  [address]  [place] [address]  Office [phone]  Fax [phone]      [facility]  Mara Moreno  : 2000  MRN: [id number]    Subjective   Subjective     Chief Complaint   Patient presents with    Infertility     Patient here to discussion of medical induction of ovulation. Periods are very irregular without positive ovulation tests. Wanting to discuss starting clomid for this. Patient has been tracking cycles since 2023.       History of Present Illness  Mara Moreno is a 23 y.o. female , , who comes to the office today for fertility. Irregular menses. No positive home ovulation kits. Patient's last menstrual period was 2024 (approximate). Cycles irregular. May have a cycle every other month. Has been tracking cycles since July of last year. Flow <7 days. Checking ovulation predictor kits and has never truly had a positive.   .      Review of Systems   Genitourinary:  Positive for menstrual problem. Negative for decreased urine volume, difficulty urinating, dyspareunia, dysuria, enuresis, flank pain, frequency, genital sores, hematuria, pelvic pain, urgency, vaginal bleeding, vaginal discharge and vaginal pain.   All other systems reviewed and are negative.       The following portions of the patient's history were reviewed and updated as appropriate: allergies, current medications, past family history, past medical history, past social history, past surgical history, and problem list.    Objective    Objective     Vitals:   Visit Vitals  /82   Ht 167.6 cm (66\")   Wt 117 kg (257 lb 3.2 oz)   LMP 2024 (Approximate)   BMI 41.51 kg/m²        Physical Exam  Vitals reviewed.   Constitutional:       General: She is not in acute distress.     Appearance: Normal appearance. She is obese. She is not ill-appearing.   HENT:      Head: Normocephalic and atraumatic.      Nose: No congestion or rhinorrhea.   Eyes:      General: No " scleral icterus.        Right eye: No discharge.         Left eye: No discharge.      Extraocular Movements: Extraocular movements intact.      Conjunctiva/sclera: Conjunctivae normal.   Pulmonary:      Effort: Pulmonary effort is normal. No accessory muscle usage or respiratory distress.   Musculoskeletal:      Right lower leg: No edema.      Left lower leg: No edema.   Skin:     General: Skin is warm and dry.      Coloration: Skin is not ashen, cyanotic or jaundiced.   Neurological:      General: No focal deficit present.      Mental Status: She is alert and oriented to person, place, and time.   Psychiatric:         Mood and Affect: Mood normal.         Behavior: Behavior is cooperative.         Assessment & Plan   Assessment / Plan     Diagnoses and all orders for this visit:    1. PCOS (polycystic ovarian syndrome) (Primary)  -     letrozole (FEMARA) 2.5 MG tablet; Take 1 tablet by mouth Daily. Take daily for 5 days starting on cycle day 3  Dispense: 5 tablet; Refill: 3  -     medroxyPROGESTERone (Provera) 10 MG tablet; Take 1 tablet by mouth Daily. Take at the beginning of the month to induce a menses  Dispense: 7 tablet; Refill: 3  -     Follicle Stimulating Hormone  -     Estradiol  -     Hemoglobin A1c  -     Insulin, Total  -     Luteinizing Hormone  -     Progesterone  -     Testosterone  -     Comprehensive Metabolic Panel    2. Irregular menses    3. Infertility, anovulation    4. BMI 40.0-44.9, adult  Comments:  encouraged weight loss, diet, exercise        Discussion:   Fertility discussion:    1. Explanation of Infertility: We discussed the definition of infertility, explaining that it's the inability to conceive after a year of regular unprotected intercourse. The potential causes were also discussed, including factors related to male, female, both, or unexplained causes.    2. Detailed Discussion on Causes: We delved into the specific potential causes of infertility, including ovulation disorders,  sperm disorders, fallopian tube blockage, endometriosis, age-related factors, and lifestyle factors.    3. Overview of Diagnostic Tests: The patient was informed about the various diagnostic tests used to identify the cause of infertility. These include hormonal blood tests, ovulation tests, ultrasound scans, hysterosalpingogram, laparoscopy for females, and semen analysis, hormonal tests, and ultrasound scans for males.    4. Treatment Options: We discussed the potential treatment options, including medical treatments (hormonal medications, antibiotics), surgical treatments, assisted reproductive technologies (Intrauterine insemination, In vitro fertilization), and lifestyle modifications.    5. Emotional Aspects: We addressed the emotional impact of dealing with infertility. The patient was reassured that feelings of stress, sadness, or frustration are common and that emotional support is available. We discussed the benefits of counseling or support groups to help navigate the emotional aspects of infertility.    6. Decision-Making: The patient was encouraged to consider all the information, discuss it with her partner, if applicable, and make decisions about diagnostic tests and potential treatments.    Cycle/ovulation induction discussed. Risks/benefits discussed. Pregnancy discussed in terms of risks secondary to obesity. Questions answered. She expressed understanding. Weight loss goal discussed. Plan for trial of letrozole. Discussed its use. Discussed may need to increase dose vs changing to clomid. Cycle induction discussed with progesterone. Questions answered. She expressed understanding. She will return for labs later this week for cycle day 21 labs.     Follow-up: Return if symptoms worsen or fail to improve.    Faizan Katz MD

## 2024-02-05 NOTE — PATIENT INSTRUCTIONS
Take Provera for 7 days to induce a period.  The first day of bleeding is cycle day #1.  Take letrozole days 3-7 to induce ovulation.  Do home ovulation testing (LH kits) ~days 11-16.  If LH kits positive, have unprotected vaginal-penile intercourse.   If menses does not occur in 2 weeks, take a pregnancy test.

## 2024-02-12 LAB
ALBUMIN SERPL-MCNC: 4.5 G/DL (ref 3.5–5.2)
ALBUMIN/GLOB SERPL: 2 G/DL
ALP SERPL-CCNC: 85 U/L (ref 39–117)
ALT SERPL-CCNC: 77 U/L (ref 1–33)
AST SERPL-CCNC: 44 U/L (ref 1–32)
BILIRUB SERPL-MCNC: 0.3 MG/DL (ref 0–1.2)
BUN SERPL-MCNC: 10 MG/DL (ref 6–20)
BUN/CREAT SERPL: 14.7 (ref 7–25)
CALCIUM SERPL-MCNC: 9.4 MG/DL (ref 8.6–10.5)
CHLORIDE SERPL-SCNC: 104 MMOL/L (ref 98–107)
CO2 SERPL-SCNC: 23.7 MMOL/L (ref 22–29)
CREAT SERPL-MCNC: 0.68 MG/DL (ref 0.57–1)
EGFRCR SERPLBLD CKD-EPI 2021: 125.7 ML/MIN/1.73
ESTRADIOL SERPL-MCNC: 76.8 PG/ML
FSH SERPL-ACNC: 4.7 MIU/ML
GLOBULIN SER CALC-MCNC: 2.2 GM/DL
GLUCOSE SERPL-MCNC: 103 MG/DL (ref 65–99)
HBA1C MFR BLD: 5.4 % (ref 4.8–5.6)
INSULIN SERPL-ACNC: 52.4 UIU/ML (ref 2.6–24.9)
LH SERPL-ACNC: 15.1 MIU/ML
POTASSIUM SERPL-SCNC: 4.2 MMOL/L (ref 3.5–5.2)
PROGEST SERPL-MCNC: 3.4 NG/ML
PROT SERPL-MCNC: 6.7 G/DL (ref 6–8.5)
SODIUM SERPL-SCNC: 140 MMOL/L (ref 136–145)
TESTOST SERPL-MCNC: 22 NG/DL (ref 13–71)

## 2024-03-14 DIAGNOSIS — E28.2 PCOS (POLYCYSTIC OVARIAN SYNDROME): Primary | ICD-10-CM

## 2024-03-14 RX ORDER — METFORMIN HYDROCHLORIDE 500 MG/1
1000 TABLET, EXTENDED RELEASE ORAL
Qty: 60 TABLET | Refills: 4 | Status: SHIPPED | OUTPATIENT
Start: 2024-03-14

## 2024-03-20 RX ORDER — PROGESTERONE 200 MG/1
200 CAPSULE ORAL NIGHTLY
Qty: 30 CAPSULE | Refills: 3 | Status: SHIPPED | OUTPATIENT
Start: 2024-03-20

## 2024-03-30 ENCOUNTER — HOSPITAL ENCOUNTER (EMERGENCY)
Facility: HOSPITAL | Age: 24
Discharge: HOME | End: 2024-03-30
Payer: COMMERCIAL

## 2024-03-30 ENCOUNTER — APPOINTMENT (OUTPATIENT)
Dept: ULTRASOUND IMAGING | Facility: HOSPITAL | Age: 24
End: 2024-03-30
Payer: COMMERCIAL

## 2024-03-30 VITALS
TEMPERATURE: 98.1 F | RESPIRATION RATE: 18 BRPM | BODY MASS INDEX: 41.75 KG/M2 | WEIGHT: 266 LBS | SYSTOLIC BLOOD PRESSURE: 124 MMHG | HEIGHT: 67 IN | HEART RATE: 95 BPM | OXYGEN SATURATION: 98 % | DIASTOLIC BLOOD PRESSURE: 76 MMHG

## 2024-03-30 DIAGNOSIS — N93.9 VAGINAL BLEEDING: Primary | ICD-10-CM

## 2024-03-30 DIAGNOSIS — Z3A.01 LESS THAN 8 WEEKS GESTATION OF PREGNANCY: ICD-10-CM

## 2024-03-30 LAB
ALBUMIN SERPL-MCNC: 4.3 G/DL (ref 3.5–5.2)
ALBUMIN/GLOB SERPL: 1.7 G/DL
ALP SERPL-CCNC: 76 U/L (ref 39–117)
ALT SERPL W P-5'-P-CCNC: 77 U/L (ref 1–33)
ANION GAP SERPL CALCULATED.3IONS-SCNC: 11 MMOL/L (ref 5–15)
AST SERPL-CCNC: 51 U/L (ref 1–32)
BACTERIA UR QL AUTO: ABNORMAL /HPF
BASOPHILS # BLD AUTO: 0.04 10*3/MM3 (ref 0–0.2)
BASOPHILS NFR BLD AUTO: 0.5 % (ref 0–1.5)
BILIRUB SERPL-MCNC: 0.4 MG/DL (ref 0–1.2)
BILIRUB UR QL STRIP: NEGATIVE
BUN SERPL-MCNC: 5 MG/DL (ref 6–20)
BUN/CREAT SERPL: 9.8 (ref 7–25)
CALCIUM SPEC-SCNC: 9.4 MG/DL (ref 8.6–10.5)
CHLORIDE SERPL-SCNC: 106 MMOL/L (ref 98–107)
CLARITY UR: CLEAR
CO2 SERPL-SCNC: 23 MMOL/L (ref 22–29)
COLOR UR: YELLOW
CREAT SERPL-MCNC: 0.51 MG/DL (ref 0.57–1)
DEPRECATED RDW RBC AUTO: 39.7 FL (ref 37–54)
EGFRCR SERPLBLD CKD-EPI 2021: 133.9 ML/MIN/1.73
EOSINOPHIL # BLD AUTO: 0.12 10*3/MM3 (ref 0–0.4)
EOSINOPHIL NFR BLD AUTO: 1.4 % (ref 0.3–6.2)
ERYTHROCYTE [DISTWIDTH] IN BLOOD BY AUTOMATED COUNT: 12.8 % (ref 12.3–15.4)
GLOBULIN UR ELPH-MCNC: 2.6 GM/DL
GLUCOSE SERPL-MCNC: 176 MG/DL (ref 65–99)
GLUCOSE UR STRIP-MCNC: NEGATIVE MG/DL
HCG INTACT+B SERPL-ACNC: 2115 MIU/ML
HCT VFR BLD AUTO: 37.9 % (ref 34–46.6)
HGB BLD-MCNC: 12.5 G/DL (ref 12–15.9)
HGB UR QL STRIP.AUTO: NEGATIVE
HYALINE CASTS UR QL AUTO: ABNORMAL /LPF
IMM GRANULOCYTES # BLD AUTO: 0.04 10*3/MM3 (ref 0–0.05)
IMM GRANULOCYTES NFR BLD AUTO: 0.5 % (ref 0–0.5)
KETONES UR QL STRIP: NEGATIVE
LEUKOCYTE ESTERASE UR QL STRIP.AUTO: ABNORMAL
LIPASE SERPL-CCNC: 40 U/L (ref 13–60)
LYMPHOCYTES # BLD AUTO: 2.15 10*3/MM3 (ref 0.7–3.1)
LYMPHOCYTES NFR BLD AUTO: 25.7 % (ref 19.6–45.3)
MCH RBC QN AUTO: 28.3 PG (ref 26.6–33)
MCHC RBC AUTO-ENTMCNC: 33 G/DL (ref 31.5–35.7)
MCV RBC AUTO: 85.9 FL (ref 79–97)
MONOCYTES # BLD AUTO: 0.44 10*3/MM3 (ref 0.1–0.9)
MONOCYTES NFR BLD AUTO: 5.3 % (ref 5–12)
NEUTROPHILS NFR BLD AUTO: 5.57 10*3/MM3 (ref 1.7–7)
NEUTROPHILS NFR BLD AUTO: 66.6 % (ref 42.7–76)
NITRITE UR QL STRIP: NEGATIVE
NRBC BLD AUTO-RTO: 0 /100 WBC (ref 0–0.2)
PH UR STRIP.AUTO: 7 [PH] (ref 5–8)
PLATELET # BLD AUTO: 290 10*3/MM3 (ref 140–450)
PMV BLD AUTO: 9.4 FL (ref 6–12)
POTASSIUM SERPL-SCNC: 4 MMOL/L (ref 3.5–5.2)
PROT SERPL-MCNC: 6.9 G/DL (ref 6–8.5)
PROT UR QL STRIP: NEGATIVE
RBC # BLD AUTO: 4.41 10*6/MM3 (ref 3.77–5.28)
RBC # UR STRIP: ABNORMAL /HPF
REF LAB TEST METHOD: ABNORMAL
SODIUM SERPL-SCNC: 140 MMOL/L (ref 136–145)
SP GR UR STRIP: 1.01 (ref 1–1.03)
SQUAMOUS #/AREA URNS HPF: ABNORMAL /HPF
UROBILINOGEN UR QL STRIP: ABNORMAL
WBC # UR STRIP: ABNORMAL /HPF
WBC NRBC COR # BLD AUTO: 8.36 10*3/MM3 (ref 3.4–10.8)

## 2024-03-30 PROCEDURE — 76817 TRANSVAGINAL US OBSTETRIC: CPT

## 2024-03-30 PROCEDURE — 80053 COMPREHEN METABOLIC PANEL: CPT

## 2024-03-30 PROCEDURE — 99284 EMERGENCY DEPT VISIT MOD MDM: CPT

## 2024-03-30 PROCEDURE — 83690 ASSAY OF LIPASE: CPT

## 2024-03-30 PROCEDURE — 81001 URINALYSIS AUTO W/SCOPE: CPT

## 2024-03-30 PROCEDURE — 84702 CHORIONIC GONADOTROPIN TEST: CPT

## 2024-03-30 PROCEDURE — 85025 COMPLETE CBC W/AUTO DIFF WBC: CPT

## 2024-03-30 NOTE — ED PROVIDER NOTES
Subjective   History of Present Illness  Patient is a 24-year-old female who presents emergency department with complaints of vaginal bleeding.  Patient reports that she will be 6 weeks pregnant tomorrow.  States that today around 11 AM she started to have light vaginal bleeding.  She described it as bright red/pink-tinged.  She denies clots.  She is also complaining of mild lower abdominal cramps.  She does report a miscarriage last year.  Patient follows with Dr. Katz as her OB/GYN.  Patient also states that she has been taking progesterone vaginally.  She is not sure if this could be causing her symptoms.  Patient denies any urinary symptoms.  States that she was actually treated for UTI 2 weeks ago.        Review of Systems   Gastrointestinal:  Positive for abdominal pain.   Genitourinary:  Positive for vaginal bleeding.   All other systems reviewed and are negative.      Past Medical History:   Diagnosis Date    Abnormal ECG 10-    V-FIB cardiac arrested. Has a defibrillator.    Anxiety     Depression 2018    Fracture of 5th metatarsal     Left    Hypertension     Polycystic ovary syndrome 2023    PONV (postoperative nausea and vomiting)     Urinary tract infection 2012       Allergies   Allergen Reactions    Cefdinir Hives and Itching    Amoxicillin Rash    Penicillins Swelling and Rash       Past Surgical History:   Procedure Laterality Date    CARDIAC DEFIBRILLATOR PLACEMENT      EYE SURGERY      15 months of age    TOE PERCUTANEOUS PINNING Left 07/18/2018    Procedure: PERCUTANEOUS SCREW FIXATION LEFT 5TH METATARSAL BASE;  Surgeon: Joseph Watkins MD;  Location: [facility] OR;  Service: Orthopedics    TONSILLECTOMY      WISDOM TOOTH EXTRACTION  2017       Family History   Problem Relation Age of Onset    Hypertension Father     Breast cancer Maternal Grandmother     Ovarian cancer Neg Hx     Uterine cancer Neg Hx     Melanoma Neg Hx     Colon cancer Neg Hx        Social History     Socioeconomic  History    Marital status:    Tobacco Use    Smoking status: Never    Smokeless tobacco: Never   Vaping Use    Vaping status: Never Used   Substance and Sexual Activity    Alcohol use: Yes     Comment: rare    Drug use: No    Sexual activity: Yes     Partners: Male     Birth control/protection: None           Objective   Physical Exam  Vitals and nursing note reviewed.   Constitutional:       General: She is not in acute distress.     Appearance: Normal appearance. She is normal weight. She is not ill-appearing or toxic-appearing.   HENT:      Head: Normocephalic.   Cardiovascular:      Rate and Rhythm: Normal rate and regular rhythm.      Pulses: Normal pulses.      Heart sounds: Normal heart sounds.   Pulmonary:      Effort: Pulmonary effort is normal.      Breath sounds: Normal breath sounds.   Abdominal:      General: Abdomen is flat. Bowel sounds are normal. There is no distension.      Palpations: Abdomen is soft.      Tenderness: There is no abdominal tenderness.   Musculoskeletal:         General: Normal range of motion.      Cervical back: Normal range of motion and neck supple.   Skin:     General: Skin is warm and dry.   Neurological:      General: No focal deficit present.      Mental Status: She is alert and oriented to person, place, and time. Mental status is at baseline.   Psychiatric:         Mood and Affect: Mood normal.         Behavior: Behavior normal.         Thought Content: Thought content normal.         Judgment: Judgment normal.         Procedures           ED Course                                             Medical Decision Making  Mara Moreno is a very pleasant 24 y.o. female who presents to the emergency department for complaints of vaginal bleeding while pregnant.    Patient was non-toxic and not-ill appearing on arrival. Vital signs stable.     Patient's presentation raises suspicion for differentials including, but not limited to, pregnancy, anemia, miscarriage UTI.      External (non-ED) record review: None    Given this, laboratory studies and imaging studies were ordered including CBC, CMP, lipase, hCG quantitative, urinalysis, transvaginal OB ultrasound.     Imaging was reviewed by radiologist.  Transvaginal OB ultrasound revealed Intrauterine gestational sac with no visible fetal pole or yolk sac. Bilateral ovarian cyst or follicle. Follow-up hCG and ultrasound recommended to assess for developing intrauterine pregnancy.    Labs were reviewed.  CBC unremarkable.  CMP unremarkable when compared to previous labs.  Lipase normal.  hCG 2115. Urinalysis revealed no bacteria, 3-5 WBCs, 3-6 squamous epithelial cells.  Likely a contaminant at this point.  Patient denied any urinary symptoms.  On re-evaluation, patient remained hemodynamically stable and appeared to be comfortable and in no acute distress.  Ultrasound did reveal an intrauterine gestational sac, but no visible fetal pole or yolk sac.  Informed patient that she could likely be too early in her pregnancy.  I also informed patient of other differentials such as a miscarriage.  She was advised to call and schedule appointment with OB/GYN as soon as possible.  Patient stated that she does have an appointment on Monday.  She will need repeat hCG and ultrasound based on their recommendations. Advised pelvic rest.    I discussed all of the lab and imaging results with the patient during this visit in the emergency department. I answered all the questions regarding the emergency department evaluation, diagnosis, and treatment plan. We talked about how crucial it is for the patient to follow up by calling their primary care provider and OB/GYN as soon as possible to schedule an appointment for within the next few days or as soon as possible so that the symptoms can be reassessed to see if they have improved or to answer any additional questions. I also provided the patient with advice on returning safely and urged the patient  to visit the emergency department right away if any worsening or new symptoms appeared. The patient verbalized understanding of the discharge instructions and agreed with them. Mara was discharged in stable condition.    Signed by:   ELLIOT Osorio 3/30/2024 16:04 CDT     Dragon disclaimer:  Part of this note may be an electronic transcription/translation of spoken language to printed text using the Dragon Dictation System.    Problems Addressed:  Less than 8 weeks gestation of pregnancy: complicated acute illness or injury  Vaginal bleeding: complicated acute illness or injury    Amount and/or Complexity of Data Reviewed  Labs: ordered.  Radiology: ordered.        Final diagnoses:   Vaginal bleeding   Less than 8 weeks gestation of pregnancy       ED Disposition  ED Disposition       ED Disposition   Discharge    Condition   Stable    Comment   --               Taylor Wan PA  [address] [address]  [phone]    Schedule an appointment as soon as possible for a visit in 1 day      [facility] EMERGENCY DEPARTMENT  [address] [place] [id number] [phone]  Go to   If symptoms worsen         Medication List      No changes were made to your prescriptions during this visit.            Kristi Barber, ELLIOT  03/30/24 1619

## 2024-03-30 NOTE — DISCHARGE INSTRUCTIONS
Today you are seen in the ER for your symptoms.  Your hCG level is appropriate for how far along you are in your pregnancy.  Your ultrasound revealed an intrauterine gestational sac, but no fetal pole was visualized at this time.  This could likely be because you are too early in your pregnancy.  You will need to follow-up with OB/GYN as soon as possible to reassess symptoms.  You will need a repeat ultrasound and hCG level based on their recommendations.  Please return the ER for any new or worsening symptoms.

## 2024-04-01 ENCOUNTER — TELEPHONE (OUTPATIENT)
Dept: OBSTETRICS AND GYNECOLOGY | Age: 24
End: 2024-04-01
Payer: COMMERCIAL

## 2024-04-01 ENCOUNTER — OFFICE VISIT (OUTPATIENT)
Dept: OBSTETRICS AND GYNECOLOGY | Age: 24
End: 2024-04-01
Payer: COMMERCIAL

## 2024-04-01 ENCOUNTER — LAB (OUTPATIENT)
Dept: LAB | Facility: HOSPITAL | Age: 24
End: 2024-04-01
Payer: COMMERCIAL

## 2024-04-01 VITALS
SYSTOLIC BLOOD PRESSURE: 118 MMHG | DIASTOLIC BLOOD PRESSURE: 80 MMHG | WEIGHT: 260 LBS | BODY MASS INDEX: 40.81 KG/M2 | HEIGHT: 67 IN

## 2024-04-01 DIAGNOSIS — O20.9 BLEEDING IN EARLY PREGNANCY: Primary | ICD-10-CM

## 2024-04-01 LAB — HCG INTACT+B SERPL-ACNC: 4485 MIU/ML

## 2024-04-01 PROCEDURE — 99213 OFFICE O/P EST LOW 20 MIN: CPT | Performed by: OBSTETRICS & GYNECOLOGY

## 2024-04-01 PROCEDURE — 36415 COLL VENOUS BLD VENIPUNCTURE: CPT

## 2024-04-01 PROCEDURE — 84144 ASSAY OF PROGESTERONE: CPT | Performed by: OBSTETRICS & GYNECOLOGY

## 2024-04-01 PROCEDURE — 84702 CHORIONIC GONADOTROPIN TEST: CPT

## 2024-04-01 NOTE — PROGRESS NOTES
"    Faizan Katz MD  [facility]  [address]  [place] [address]  Office [phone]  Fax [phone]      [facility]  Mara Moreno  : 2000  MRN: [id number]    Subjective   Subjective     Chief Complaint   Patient presents with    Pregnancy Ultrasound     Patient here for ultrasound. Some vaginal bleeding in early pregnancy. GS seen measuring 5w4d, LMP of 1824 puts her at 6w1d.   Light bleeding and mild cramps Saturday, none since then.        History of Present Illness  Mara Moreno is a 24 y.o. female , , who comes to the office today for early pregnancy. Was seen in the ER over the weekend for vaginal bleeding and cramping. No pelvic pain/cramping or bleeding since then. LMP 2024. Ovulation induction with letrozole for this past cycle. Taking progesterone at this time as well.       Review of Systems   Genitourinary:  Negative for decreased urine volume, difficulty urinating, dyspareunia, dysuria, enuresis, flank pain, frequency, genital sores, hematuria, menstrual problem, pelvic pain, urgency, vaginal bleeding, vaginal discharge and vaginal pain.   All other systems reviewed and are negative.       The following portions of the patient's history were reviewed and updated as appropriate: allergies, current medications, past family history, past medical history, past social history, past surgical history, and problem list.    Objective    Objective     Vitals:   Visit Vitals  /80   Ht 170.2 cm (67\")   Wt 118 kg (260 lb)   LMP 2024 (Exact Date)   BMI 40.72 kg/m²        Physical Exam  Vitals reviewed.   Constitutional:       General: She is not in acute distress.     Appearance: Normal appearance. She is not ill-appearing.   HENT:      Head: Normocephalic and atraumatic.      Nose: No congestion or rhinorrhea.   Eyes:      General: No scleral icterus.        Right eye: No discharge.         Left eye: No discharge.      Extraocular Movements: Extraocular " movements intact.      Conjunctiva/sclera: Conjunctivae normal.   Pulmonary:      Effort: Pulmonary effort is normal. No accessory muscle usage or respiratory distress.   Musculoskeletal:      Right lower leg: No edema.      Left lower leg: No edema.   Skin:     General: Skin is warm and dry.      Coloration: Skin is not ashen, cyanotic or jaundiced.   Neurological:      General: No focal deficit present.      Mental Status: She is alert and oriented to person, place, and time.   Psychiatric:         Mood and Affect: Mood normal.         Behavior: Behavior is cooperative.            Result Review    hCG, Quantitative, Pregnancy (03/30/2024 15:02) = 2115    ABO:   A Positive       US Ob Transvaginal (03/30/2024 15:27)   IMPRESSION:  1. Intrauterine gestational sac with no visible fetal pole or yolk sac.  2. Bilateral ovarian cyst or follicle.  3. Follow-up hCG and ultrasound recommended to assess for developing  intrauterine pregnancy.       US Ob Transvaginal (04/01/2024 15:03)   Impression:  Intrauterine fluid collection, no yolk sac, no fetal pole.  Gestational sac measures 8.2 mm, 5w4d.   Left ovary with corpus luteal cyst.   Otherwise, bilateral ovaries appear normal.         Assessment & Plan   Assessment / Plan     Diagnoses and all orders for this visit:    1. Bleeding in early pregnancy (Primary)  -     HCG, B-subunit, Quantitative  -     Progesterone        Discussion: Check hcg level. Repeat ultrasound in 2 weeks. Discussed threatened miscarriage vs early pregnancy. Questions answered. She expressed understanding.     Follow-up: Return in about 2 weeks (around 4/15/2024) for OB visit, OB US, new OB.    Faizan Katz MD

## 2024-04-01 NOTE — TELEPHONE ENCOUNTER
Pt called stating that she is early in pregnancy and went to ER on 3-30 for vaginal bleeding. Pt voices she talked with Kaelyn over the weekend and she recommended her coming in today for OV and ultrasound. Pt transferred to scheduling

## 2024-04-02 LAB — PROGEST SERPL-MCNC: 14.5 NG/ML

## 2024-04-16 ENCOUNTER — INITIAL PRENATAL (OUTPATIENT)
Dept: OBSTETRICS AND GYNECOLOGY | Age: 24
End: 2024-04-16
Payer: COMMERCIAL

## 2024-04-16 VITALS — SYSTOLIC BLOOD PRESSURE: 130 MMHG | DIASTOLIC BLOOD PRESSURE: 82 MMHG | WEIGHT: 262.5 LBS | BODY MASS INDEX: 41.11 KG/M2

## 2024-04-16 DIAGNOSIS — Z3A.01 7 WEEKS GESTATION OF PREGNANCY: Primary | ICD-10-CM

## 2024-04-16 DIAGNOSIS — Z34.90 EARLY STAGE OF PREGNANCY: ICD-10-CM

## 2024-04-16 DIAGNOSIS — Z95.810 CARDIAC DEFIBRILLATOR IN PLACE: ICD-10-CM

## 2024-04-16 DIAGNOSIS — O21.0 MORNING SICKNESS: ICD-10-CM

## 2024-04-16 PROBLEM — R74.8 ELEVATED LIVER ENZYMES: Status: ACTIVE | Noted: 2021-10-28

## 2024-04-16 PROCEDURE — 0501F PRENATAL FLOW SHEET: CPT | Performed by: OBSTETRICS & GYNECOLOGY

## 2024-04-16 RX ORDER — METOCLOPRAMIDE 10 MG/1
10 TABLET ORAL EVERY 6 HOURS PRN
Qty: 30 TABLET | Refills: 1 | Status: SHIPPED | OUTPATIENT
Start: 2024-04-16

## 2024-04-16 NOTE — PROGRESS NOTES
[facility]   HISTORY AND PHYSICAL  Subjective   Subjective     Chief Complaint:   Chief Complaint   Patient presents with    Initial Prenatal Visit     Patient here for initial OB appt. U/S today showing IUP measuring 7w3d. LMP 24. No pressing concerns or questions.   Nausea has been problematic. Has some questions about medications that are safe.        History of Present Illness  Mara Moreno is a 24 y.o. female  who presents for NEW OB. Pregnancy from letrozole ovulation induction. Patient's last menstrual period was 2024 (exact date). Doing well so far. Nausea in the morning only. No other complaints. Denies discharge of her defibrillator.       Review of Systems   Gastrointestinal:  Positive for nausea. Negative for vomiting.   Genitourinary:  Negative for decreased urine volume, difficulty urinating, dyspareunia, dysuria, enuresis, flank pain, frequency, genital sores, hematuria, menstrual problem, pelvic pain, urgency, vaginal bleeding, vaginal discharge and vaginal pain.   All other systems reviewed and are negative.       Personal History     OB History    Para Term  AB Living   2 0 0 0 1 0   SAB IAB Ectopic Molar Multiple Live Births   1 0 0 0 0 0      # Outcome Date GA Lbr Jose Luis/2nd Weight Sex Type Anes PTL Lv   2 Current            1 SAB              Past Medical History:   Diagnosis Date    Abnormal ECG 10-    V-FIB cardiac arrested. Has a defibrillator.    Anxiety     Depression     Fracture of 5th metatarsal     Left    Hypertension     Polycystic ovary syndrome     PONV (postoperative nausea and vomiting)     Urinary tract infection      Past Surgical History:   Procedure Laterality Date    CARDIAC DEFIBRILLATOR PLACEMENT      EYE SURGERY      15 months of age    TOE PERCUTANEOUS PINNING Left 2018    Procedure: PERCUTANEOUS SCREW FIXATION LEFT 5TH METATARSAL BASE;  Surgeon: Joseph Watkins MD;  Location: [facility] OR;  Service:  Orthopedics    TONSILLECTOMY      WISDOM TOOTH EXTRACTION  2017       Home Medications:  Progesterone, metoclopramide, nadolol, prenatal vitamin 27-0.8, and sertraline    Allergies:  She is allergic to cefdinir, amoxicillin, and penicillins.    Objective    Objective     Vitals:   BP: (130)/(82) 130/82    Physical Exam  Vitals reviewed.   Constitutional:       General: She is not in acute distress.     Appearance: Normal appearance. She is not ill-appearing.   HENT:      Head: Normocephalic and atraumatic.      Nose: No congestion or rhinorrhea.   Eyes:      General: No scleral icterus.        Right eye: No discharge.         Left eye: No discharge.      Extraocular Movements: Extraocular movements intact.      Conjunctiva/sclera: Conjunctivae normal.   Pulmonary:      Effort: Pulmonary effort is normal. No accessory muscle usage or respiratory distress.   Musculoskeletal:      Right lower leg: No edema.      Left lower leg: No edema.   Skin:     General: Skin is warm and dry.      Coloration: Skin is not ashen, cyanotic or jaundiced.   Neurological:      General: No focal deficit present.      Mental Status: She is alert and oriented to person, place, and time.   Psychiatric:         Mood and Affect: Mood normal.         Behavior: Behavior is cooperative.         Result Review    US Ob < 14 Weeks Single or First Gestation (04/16/2024 14:18)   Intrauterine pregnancy at 7w3d by crown-rump length  Viable first trimester gestation,   Bilateral ovaries appear normal     Assessment & Plan   Assessment / Plan     Diagnoses and all orders for this visit:    1. 7 weeks gestation of pregnancy (Primary)  -     Chlamydia trachomatis, Neisseria gonorrhoeae, PCR w/ confirmation - Urine, Urine, Clean Catch  -     ABO / Rh  -     Ambulatory Referral to [facility]/Perinatology  -     Antibody Screen  -     CBC & Differential  -     Hepatitis B Surface Antigen  -     Varicella Zoster Antibody, IgG  -     Urine Culture - Urine, Urine,  Clean Catch  -     ToxASSURE Select 13 (MW) - Urine, Clean Catch  -     Rubella Antibody, IgG  -     RPR  -     HIV-1 / O / 2 Ag / Antibody  -     Hepatitis C Antibody    2. Morning sickness  -     metoclopramide (Reglan) 10 MG tablet; Take 1 tablet by mouth Every 6 (Six) Hours As Needed (nausea/vomiting).  Dispense: 30 tablet; Refill: 1    3. Early stage of pregnancy  -     Chlamydia trachomatis, Neisseria gonorrhoeae, PCR w/ confirmation - Urine, Urine, Clean Catch  -     ABO / Rh  -     Ambulatory Referral to MFM/Perinatology  -     Antibody Screen  -     CBC & Differential  -     Hepatitis B Surface Antigen  -     Varicella Zoster Antibody, IgG  -     Urine Culture - Urine, Urine, Clean Catch  -     ToxASSURE Select 13 (MW) - Urine, Clean Catch  -     Rubella Antibody, IgG  -     RPR  -     HIV-1 / O / 2 Ag / Antibody  -     Hepatitis C Antibody    4. Cardiac defibrillator in place  -     Ambulatory Referral to MFM/Perinatology        Discussion:   New OB Visit. US ordered today, reviewed and shows 7 weeks gestation, EDC 11/30/24.  New OB labs ordered today  Discussed OTC Unisom and B6  COVID vaccine, Tdap, and flu vaccine recommendations  Discussed ffDNA screening option  Discussed normal prenatal routines  Questions answered  MFM - referral placed for consult    Return in about 4 weeks (around 5/14/2024).    Faizan Katz MD

## 2024-04-24 LAB
ABO GROUP BLD: NORMAL
BACTERIA UR CULT: ABNORMAL
BASOPHILS # BLD AUTO: 0.07 10*3/MM3 (ref 0–0.2)
BASOPHILS NFR BLD AUTO: 0.6 % (ref 0–1.5)
BLD GP AB SCN SERPL QL: NEGATIVE
C TRACH RRNA SPEC QL NAA+PROBE: NEGATIVE
DRUGS UR: NORMAL
EOSINOPHIL # BLD AUTO: 0.14 10*3/MM3 (ref 0–0.4)
EOSINOPHIL NFR BLD AUTO: 1.2 % (ref 0.3–6.2)
ERYTHROCYTE [DISTWIDTH] IN BLOOD BY AUTOMATED COUNT: 12.3 % (ref 12.3–15.4)
HBV SURFACE AG SERPL QL IA: NEGATIVE
HCT VFR BLD AUTO: 38.6 % (ref 34–46.6)
HCV IGG SERPL QL IA: NON REACTIVE
HGB BLD-MCNC: 12.5 G/DL (ref 12–15.9)
HIV 1+2 AB+HIV1 P24 AG SERPL QL IA: NON REACTIVE
IMM GRANULOCYTES # BLD AUTO: 0.06 10*3/MM3 (ref 0–0.05)
IMM GRANULOCYTES NFR BLD AUTO: 0.5 % (ref 0–0.5)
LYMPHOCYTES # BLD AUTO: 2.46 10*3/MM3 (ref 0.7–3.1)
LYMPHOCYTES NFR BLD AUTO: 20.7 % (ref 19.6–45.3)
MCH RBC QN AUTO: 27.5 PG (ref 26.6–33)
MCHC RBC AUTO-ENTMCNC: 32.4 G/DL (ref 31.5–35.7)
MCV RBC AUTO: 85 FL (ref 79–97)
MONOCYTES # BLD AUTO: 0.59 10*3/MM3 (ref 0.1–0.9)
MONOCYTES NFR BLD AUTO: 5 % (ref 5–12)
N GONORRHOEA RRNA SPEC QL NAA+PROBE: NEGATIVE
NEUTROPHILS # BLD AUTO: 8.56 10*3/MM3 (ref 1.7–7)
NEUTROPHILS NFR BLD AUTO: 72 % (ref 42.7–76)
NRBC BLD AUTO-RTO: 0 /100 WBC (ref 0–0.2)
PLATELET # BLD AUTO: 324 10*3/MM3 (ref 140–450)
RBC # BLD AUTO: 4.54 10*6/MM3 (ref 3.77–5.28)
RH BLD: POSITIVE
RPR SER QL: NON REACTIVE
RUBV IGG SERPL IA-ACNC: 3.11 INDEX
VZV IGG SER IA-ACNC: 474 INDEX
WBC # BLD AUTO: 11.88 10*3/MM3 (ref 3.4–10.8)

## 2024-04-24 RX ORDER — CLINDAMYCIN HYDROCHLORIDE 150 MG/1
150 CAPSULE ORAL 4 TIMES DAILY
Qty: 40 CAPSULE | Refills: 0 | Status: SHIPPED | OUTPATIENT
Start: 2024-04-24 | End: 2024-05-04

## 2024-05-01 RX ORDER — NITROFURANTOIN 25; 75 MG/1; MG/1
100 CAPSULE ORAL 2 TIMES DAILY
Qty: 14 CAPSULE | Refills: 0 | Status: SHIPPED | OUTPATIENT
Start: 2024-05-01 | End: 2024-05-09

## 2024-05-02 ENCOUNTER — ROUTINE PRENATAL (OUTPATIENT)
Dept: OBSTETRICS AND GYNECOLOGY | Age: 24
End: 2024-05-02
Payer: COMMERCIAL

## 2024-05-02 VITALS — DIASTOLIC BLOOD PRESSURE: 84 MMHG | SYSTOLIC BLOOD PRESSURE: 130 MMHG | WEIGHT: 259.6 LBS | BODY MASS INDEX: 40.66 KG/M2

## 2024-05-02 DIAGNOSIS — O23.41 URINARY TRACT INFECTION IN MOTHER DURING FIRST TRIMESTER OF PREGNANCY: ICD-10-CM

## 2024-05-02 DIAGNOSIS — N86 ECTROPION OF CERVIX: ICD-10-CM

## 2024-05-02 DIAGNOSIS — Z3A.09 9 WEEKS GESTATION OF PREGNANCY: Primary | ICD-10-CM

## 2024-05-02 DIAGNOSIS — Z88.0 PENICILLIN ALLERGY: ICD-10-CM

## 2024-05-02 DIAGNOSIS — Z88.0 ALLERGY TO AMOXICILLIN: ICD-10-CM

## 2024-05-02 DIAGNOSIS — Z88.1 ALLERGY TO CEPHALOSPORIN: ICD-10-CM

## 2024-05-02 DIAGNOSIS — O21.0 MORNING SICKNESS: ICD-10-CM

## 2024-05-02 LAB
GLUCOSE UR STRIP-MCNC: NEGATIVE MG/DL
PROT UR STRIP-MCNC: NEGATIVE MG/DL

## 2024-05-02 PROCEDURE — 0502F SUBSEQUENT PRENATAL CARE: CPT | Performed by: OBSTETRICS & GYNECOLOGY

## 2024-05-02 RX ORDER — ONDANSETRON 4 MG/1
4 TABLET, ORALLY DISINTEGRATING ORAL EVERY 8 HOURS PRN
Qty: 30 TABLET | Refills: 3 | Status: SHIPPED | OUTPATIENT
Start: 2024-05-02

## 2024-05-02 NOTE — PROGRESS NOTES
Light pink spotting, gradually improving. Denies regular contractions, leakage of fluid, vaginal bleeding or discharge. Nausea worsening - has tried to hold off on medications for now. Reglan and unisom helping a little    Total weight gain: -1.089 kg (-2 lb 6.4 oz)  Expected weight gain: 5 kg (11 lb)-9 kg (19 lb)    RN present for exam.  Speculum exam. No blood in vault. Cervix closed. Cervix with ectropion - friable. Swab taken for testing.     UTI - continue macrobid. Allergies reviewed - angioedema noted.   Ectropion reviewed - Continue progesterone  Miscarriage precautions  Morning sickness discussed - options reviewed, trial zofran  RTC 2 weeks as scheduled    Diagnoses and all orders for this visit:    1. 9 weeks gestation of pregnancy (Primary)  -     POC Urinalysis Dipstick  -     NuSwab Vaginitis (VG) - Swab, Vagina    2. Morning sickness  -     ondansetron ODT (ZOFRAN-ODT) 4 MG disintegrating tablet; Place 1 tablet on the tongue Every 8 (Eight) Hours As Needed for Nausea or Vomiting.  Dispense: 30 tablet; Refill: 3    3. Ectropion of cervix    4. Urinary tract infection in mother during first trimester of pregnancy    5. Penicillin allergy  Comments:  h/o angioedema    6. Allergy to amoxicillin  Comments:  angioedema    7. Allergy to cephalosporin  Comments:  rash, edema

## 2024-05-05 LAB
A VAGINAE DNA VAG QL NAA+PROBE: NORMAL SCORE
BVAB2 DNA VAG QL NAA+PROBE: NORMAL SCORE
C ALBICANS DNA VAG QL NAA+PROBE: NEGATIVE
C GLABRATA DNA VAG QL NAA+PROBE: NEGATIVE
MEGA1 DNA VAG QL NAA+PROBE: NORMAL SCORE
T VAGINALIS DNA VAG QL NAA+PROBE: NEGATIVE

## 2024-05-17 ENCOUNTER — ROUTINE PRENATAL (OUTPATIENT)
Dept: OBSTETRICS AND GYNECOLOGY | Age: 24
End: 2024-05-17
Payer: COMMERCIAL

## 2024-05-17 VITALS — BODY MASS INDEX: 40.82 KG/M2 | DIASTOLIC BLOOD PRESSURE: 84 MMHG | SYSTOLIC BLOOD PRESSURE: 126 MMHG | WEIGHT: 260.6 LBS

## 2024-05-17 DIAGNOSIS — Z13.71 SCREENING FOR GENETIC DISEASE CARRIER STATUS: ICD-10-CM

## 2024-05-17 DIAGNOSIS — Z3A.11 11 WEEKS GESTATION OF PREGNANCY: Primary | ICD-10-CM

## 2024-05-17 DIAGNOSIS — Z36.0 ENCOUNTER FOR ANTENATAL SCREENING FOR CHROMOSOMAL ANOMALIES: ICD-10-CM

## 2024-05-17 LAB
GLUCOSE UR STRIP-MCNC: NEGATIVE MG/DL
PROT UR STRIP-MCNC: NEGATIVE MG/DL

## 2024-05-17 PROCEDURE — 0502F SUBSEQUENT PRENATAL CARE: CPT | Performed by: OBSTETRICS & GYNECOLOGY

## 2024-05-17 NOTE — PROGRESS NOTES
Doing better, nausea/vomiting better with zofran. Still present though. Discussed options including small snacks, alternating medications.  Denies regular contractions, leakage of fluid, vaginal bleeding or discharge.    Total weight gain: -0.635 kg (-1 lb 6.4 oz)  Expected weight gain: 5 kg (11 lb)-9 kg (19 lb)    NIPT - desires, reveal planned  RTC 4 weeks    Diagnoses and all orders for this visit:    1. 11 weeks gestation of pregnancy (Primary)  -     POC Urinalysis Dipstick    2. 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 Screening for genetic disease carrier status  -     Cancel: Vidhya Horizon 14 (Pan-Ethnic Standard) - Blood, Blood, Venous  -     Vidhya Horizon 14 (Pan-Ethnic Standard) - Blood, Blood, Venous

## 2024-05-22 ENCOUNTER — PATIENT MESSAGE (OUTPATIENT)
Dept: OBSTETRICS AND GYNECOLOGY | Age: 24
End: 2024-05-22
Payer: COMMERCIAL

## 2024-05-22 DIAGNOSIS — R30.0 BURNING WITH URINATION: Primary | ICD-10-CM

## 2024-05-26 LAB
APPEARANCE UR: ABNORMAL
BACTERIA #/AREA URNS HPF: ABNORMAL /[HPF]
BACTERIA UR CULT: ABNORMAL
BILIRUB UR QL STRIP: NEGATIVE
CASTS URNS QL MICRO: ABNORMAL /LPF
COLOR UR: YELLOW
CRYSTALS URNS MICRO: ABNORMAL
EPI CELLS #/AREA URNS HPF: >10 /HPF (ref 0–10)
GLUCOSE UR QL STRIP: NEGATIVE
HGB UR QL STRIP: NEGATIVE
KETONES UR QL STRIP: NEGATIVE
LEUKOCYTE ESTERASE UR QL STRIP: ABNORMAL
MICRO URNS: ABNORMAL
MUCOUS THREADS URNS QL MICRO: PRESENT
NITRITE UR QL STRIP: NEGATIVE
OTHER ANTIBIOTIC SUSC ISLT: ABNORMAL
PH UR STRIP: 5.5 [PH] (ref 5–7.5)
PROT UR QL STRIP: ABNORMAL
RBC #/AREA URNS HPF: ABNORMAL /HPF (ref 0–2)
SP GR UR STRIP: 1.02 (ref 1–1.03)
UNIDENT CRYS URNS QL MICRO: PRESENT
UROBILINOGEN UR STRIP-MCNC: 0.2 MG/DL (ref 0.2–1)
WBC #/AREA URNS HPF: ABNORMAL /HPF (ref 0–5)

## 2024-05-27 RX ORDER — NITROFURANTOIN 25; 75 MG/1; MG/1
100 CAPSULE ORAL 2 TIMES DAILY
Qty: 14 CAPSULE | Refills: 0 | Status: SHIPPED | OUTPATIENT
Start: 2024-05-27 | End: 2024-06-03

## 2024-05-27 RX ORDER — NITROFURANTOIN 25; 75 MG/1; MG/1
100 CAPSULE ORAL 2 TIMES DAILY
Qty: 14 CAPSULE | Refills: 0 | Status: SHIPPED | OUTPATIENT
Start: 2024-05-27 | End: 2024-05-27

## 2024-05-27 RX ORDER — NITROFURANTOIN 25; 75 MG/1; MG/1
100 CAPSULE ORAL
Qty: 30 CAPSULE | Refills: 4 | Status: SHIPPED | OUTPATIENT
Start: 2024-05-27

## 2024-05-29 LAB
Lab: NEGATIVE
Lab: NORMAL
NTRA ALPHA-THALASSEMIA: NEGATIVE
NTRA BETA-HEMOGLOBINOPATHIES: NEGATIVE
NTRA CANAVAN DISEASE: NEGATIVE
NTRA CYSTIC FIBROSIS: NEGATIVE
NTRA DUCHENNE/BECKER MUSCULAR DYSTROPHY: NEGATIVE
NTRA FAMILIAL DYSAUTONOMIA: NEGATIVE
NTRA FRAGILE X SYNDROME: NEGATIVE
NTRA GALACTOSEMIA: NEGATIVE
NTRA GAUCHER DISEASE: NEGATIVE
NTRA MEDIUM CHAIN ACYL-COA DEHYDROGENASE DEFICIENCY: NEGATIVE
NTRA POLYCYSTIC KIDNEY DISEASE, AUTOSOMAL RECESSIVE: NEGATIVE
NTRA SMITH-LEMLI-OPITZ SYNDROME: NEGATIVE
NTRA SPINAL MUSCULAR ATROPHY: NEGATIVE
NTRA TAY-SACHS DISEASE: NEGATIVE

## 2024-05-30 RX ORDER — PROGESTERONE 200 MG/1
200 CAPSULE ORAL NIGHTLY
Qty: 30 CAPSULE | Refills: 0 | OUTPATIENT
Start: 2024-05-30

## 2024-05-30 RX ORDER — PROGESTERONE 200 MG/1
200 CAPSULE ORAL NIGHTLY
Qty: 30 CAPSULE | Refills: 0 | Status: SHIPPED | OUTPATIENT
Start: 2024-05-30

## 2024-06-19 ENCOUNTER — ROUTINE PRENATAL (OUTPATIENT)
Dept: OBSTETRICS AND GYNECOLOGY | Age: 24
End: 2024-06-19
Payer: COMMERCIAL

## 2024-06-19 VITALS — WEIGHT: 260.8 LBS | BODY MASS INDEX: 40.85 KG/M2 | SYSTOLIC BLOOD PRESSURE: 124 MMHG | DIASTOLIC BLOOD PRESSURE: 84 MMHG

## 2024-06-19 DIAGNOSIS — R00.0 TACHYCARDIA: ICD-10-CM

## 2024-06-19 DIAGNOSIS — O26.892 HEARTBURN DURING PREGNANCY IN SECOND TRIMESTER: ICD-10-CM

## 2024-06-19 DIAGNOSIS — O21.0 MORNING SICKNESS: ICD-10-CM

## 2024-06-19 DIAGNOSIS — R12 HEARTBURN DURING PREGNANCY IN SECOND TRIMESTER: ICD-10-CM

## 2024-06-19 DIAGNOSIS — Z3A.16 16 WEEKS GESTATION OF PREGNANCY: Primary | ICD-10-CM

## 2024-06-19 LAB
GLUCOSE UR STRIP-MCNC: NEGATIVE MG/DL
PROT UR STRIP-MCNC: ABNORMAL MG/DL

## 2024-06-19 PROCEDURE — 0502F SUBSEQUENT PRENATAL CARE: CPT | Performed by: OBSTETRICS & GYNECOLOGY

## 2024-06-19 RX ORDER — PROMETHAZINE HYDROCHLORIDE 12.5 MG/1
12.5 TABLET ORAL EVERY 6 HOURS PRN
Qty: 30 TABLET | Refills: 3 | Status: SHIPPED | OUTPATIENT
Start: 2024-06-19

## 2024-06-19 RX ORDER — NADOLOL 20 MG/1
20 TABLET ORAL DAILY
Start: 2024-06-19 | End: 2024-09-17

## 2024-06-19 RX ORDER — FAMOTIDINE 20 MG/1
20 TABLET, FILM COATED ORAL DAILY
Qty: 30 TABLET | Refills: 1 | Status: SHIPPED | OUTPATIENT
Start: 2024-06-19 | End: 2025-06-19

## 2024-06-19 NOTE — PROGRESS NOTES
Nausea still but ok. Trial of phenergan. Heartburn - pepcid discussed. Denies regular contractions, leakage of fluid, vaginal bleeding or discharge. Working with cardiologist. Heart rate tachycardic - nadolol increased.     Total weight gain: -0.544 kg (-1 lb 3.2 oz)  Expected weight gain: 5 kg (11 lb)-9 kg (19 lb)    [facility] 7/10   [facility] 4 weeks    Diagnoses and all orders for this visit:    1. 16 weeks gestation of pregnancy (Primary)  -     POC Urinalysis Dipstick    2. Heartburn during pregnancy in second trimester  -     famotidine (Pepcid) 20 MG tablet; Take 1 tablet by mouth Daily.  Dispense: 30 tablet; Refill: 1    3. Morning sickness  -     promethazine (PHENERGAN) 12.5 MG tablet; Take 1 tablet by mouth Every 6 (Six) Hours As Needed for Nausea or Vomiting.  Dispense: 30 tablet; Refill: 3    4. Tachycardia  -     nadolol (CORGARD) 20 MG tablet; Take 1 tablet by mouth Daily for 90 days.

## 2024-07-01 DIAGNOSIS — R00.0 TACHYCARDIA: ICD-10-CM

## 2024-07-01 RX ORDER — NADOLOL 20 MG/1
20 TABLET ORAL DAILY
Qty: 30 TABLET | Refills: 2
Start: 2024-07-01 | End: 2024-09-29

## 2024-07-23 ENCOUNTER — ROUTINE PRENATAL (OUTPATIENT)
Dept: OBSTETRICS AND GYNECOLOGY | Age: 24
End: 2024-07-23
Payer: COMMERCIAL

## 2024-07-23 VITALS — WEIGHT: 262.2 LBS | BODY MASS INDEX: 41.07 KG/M2 | DIASTOLIC BLOOD PRESSURE: 82 MMHG | SYSTOLIC BLOOD PRESSURE: 132 MMHG

## 2024-07-23 DIAGNOSIS — Z3A.21 21 WEEKS GESTATION OF PREGNANCY: Primary | ICD-10-CM

## 2024-07-23 DIAGNOSIS — O99.413 MATERNAL CARDIOVASCULAR DISEASE AFFECTING PREGNANCY IN THIRD TRIMESTER: ICD-10-CM

## 2024-07-23 LAB
GLUCOSE UR STRIP-MCNC: NEGATIVE MG/DL
PROT UR STRIP-MCNC: ABNORMAL MG/DL

## 2024-07-23 PROCEDURE — 0502F SUBSEQUENT PRENATAL CARE: CPT | Performed by: OBSTETRICS & GYNECOLOGY

## 2024-07-23 NOTE — PROGRESS NOTES
URI - currently. Therapies discussed. Nausea/vomiting stable/unchanged. Denies heart complaints. Endorses fetal movement. Denies regular contractions, leakage of fluid, vaginal bleeding or discharge.    Total weight gain: 0.091 kg (3.2 oz)  Expected weight gain: 5 kg (11 lb)-9 kg (19 lb)    [facility] - mid-August  [facility] 4 weeks    Diagnoses and all orders for this visit:    1. 21 weeks gestation of pregnancy (Primary)  -     POC Urinalysis Dipstick    2. Maternal cardiovascular disease affecting pregnancy in third trimester

## 2024-08-20 ENCOUNTER — ROUTINE PRENATAL (OUTPATIENT)
Dept: OBSTETRICS AND GYNECOLOGY | Age: 24
End: 2024-08-20
Payer: COMMERCIAL

## 2024-08-20 VITALS — DIASTOLIC BLOOD PRESSURE: 78 MMHG | WEIGHT: 261 LBS | SYSTOLIC BLOOD PRESSURE: 118 MMHG | BODY MASS INDEX: 40.88 KG/M2

## 2024-08-20 DIAGNOSIS — Z11.3 SCREENING FOR STDS (SEXUALLY TRANSMITTED DISEASES): ICD-10-CM

## 2024-08-20 DIAGNOSIS — Z3A.25 25 WEEKS GESTATION OF PREGNANCY: Primary | ICD-10-CM

## 2024-08-20 LAB
GLUCOSE UR STRIP-MCNC: NEGATIVE MG/DL
PROT UR STRIP-MCNC: ABNORMAL MG/DL

## 2024-08-20 PROCEDURE — 0502F SUBSEQUENT PRENATAL CARE: CPT | Performed by: OBSTETRICS & GYNECOLOGY

## 2024-08-20 NOTE — PROGRESS NOTES
Going to urgent care after visit. URI symptoms.   Possible bug bite on abdomen. Sore. No pruritus.  Has not travelled. No outside hiking.   Endorses appropriate fetal movement. Denies regular contractions, leakage of fluid, vaginal bleeding or discharge.  Desires STI screening via urine and blood testing. Concern for exposure.    Total weight gain: -0.454 kg (-1 lb)  Expected weight gain: 5 kg (11 lb)-9 kg (19 lb)    Monitor abdomen insect bite - home therapies as needed  GTT - next visit, discussed, instruction handout provided  RTC 3 weeks  MFM following - 9/10 next appointment    Diagnoses and all orders for this visit:    1. 25 weeks gestation of pregnancy (Primary)  -     POC Urinalysis Dipstick    2. Screening for STDs (sexually transmitted diseases)  -     Chlamydia trachomatis, Neisseria gonorrhoeae, Trichomonas vaginalis, PCR - Urine, Urine, Clean Catch  -     Hepatitis Panel, Acute  -     HIV-1 / O / 2 Ag / Antibody  -     Treponema pallidum AB w/Reflex RPR

## 2024-08-22 LAB
HAV IGM SERPL QL IA: NEGATIVE
HBV CORE IGM SERPL QL IA: NEGATIVE
HBV SURFACE AG SERPL QL IA: NEGATIVE
HCV AB SERPL QL IA: NORMAL
HCV IGG SERPL QL IA: NON REACTIVE
HIV 1+2 AB+HIV1 P24 AG SERPL QL IA: NON REACTIVE
TREPONEMA PALLIDUM IGG+IGM AB [PRESENCE] IN SERUM OR PLASMA BY IMMUNOASSAY: NON REACTIVE

## 2024-08-23 LAB
C TRACH RRNA SPEC QL NAA+PROBE: NEGATIVE
N GONORRHOEA RRNA SPEC QL NAA+PROBE: NEGATIVE
T VAGINALIS RRNA SPEC QL NAA+PROBE: NEGATIVE

## 2024-09-03 ENCOUNTER — ROUTINE PRENATAL (OUTPATIENT)
Age: 24
End: 2024-09-03
Payer: COMMERCIAL

## 2024-09-03 VITALS — SYSTOLIC BLOOD PRESSURE: 136 MMHG | DIASTOLIC BLOOD PRESSURE: 72 MMHG | BODY MASS INDEX: 41.04 KG/M2 | WEIGHT: 262 LBS

## 2024-09-03 DIAGNOSIS — O99.419 MATERNAL CARDIOVASCULAR DISEASE AFFECTING PREGNANCY, ANTEPARTUM: ICD-10-CM

## 2024-09-03 DIAGNOSIS — Z23 NEED FOR TDAP VACCINATION: ICD-10-CM

## 2024-09-03 DIAGNOSIS — Z13.1 SPECIAL SCREENING EXAMINATION FOR DIABETES MELLITUS: ICD-10-CM

## 2024-09-03 DIAGNOSIS — Z13.0 SCREENING FOR DEFICIENCY ANEMIA: ICD-10-CM

## 2024-09-03 DIAGNOSIS — Z34.82 MULTIGRAVIDA IN SECOND TRIMESTER: ICD-10-CM

## 2024-09-03 DIAGNOSIS — Z3A.27 27 WEEKS GESTATION OF PREGNANCY: Primary | ICD-10-CM

## 2024-09-03 DIAGNOSIS — Z71.89 ENCOUNTER FOR ANTEPARTUM CONSULTATION REGARDING LACTATION: ICD-10-CM

## 2024-09-03 DIAGNOSIS — Z11.3 SCREENING EXAMINATION FOR STI: ICD-10-CM

## 2024-09-03 DIAGNOSIS — O21.9 NAUSEA AND VOMITING DURING PREGNANCY: ICD-10-CM

## 2024-09-03 DIAGNOSIS — I25.10 MATERNAL CARDIOVASCULAR DISEASE AFFECTING PREGNANCY, ANTEPARTUM: ICD-10-CM

## 2024-09-03 PROBLEM — Z34.90 PREGNANCY: Status: ACTIVE | Noted: 2024-09-03

## 2024-09-03 PROBLEM — Z64.1 MULTIGRAVIDA: Status: ACTIVE | Noted: 2024-09-03

## 2024-09-03 LAB
GLUCOSE UR STRIP-MCNC: NEGATIVE MG/DL
PROT UR STRIP-MCNC: NEGATIVE MG/DL

## 2024-09-03 RX ORDER — BREAST PUMP
1 EACH MISCELLANEOUS AS NEEDED
Qty: 1 EACH | Refills: 0 | Status: SHIPPED | OUTPATIENT
Start: 2024-09-03

## 2024-09-03 RX ORDER — ONDANSETRON 4 MG/1
8 TABLET, ORALLY DISINTEGRATING ORAL EVERY 8 HOURS PRN
Qty: 30 TABLET | Refills: 2 | Status: SHIPPED | OUTPATIENT
Start: 2024-09-03 | End: 2024-09-03

## 2024-09-03 RX ORDER — ONDANSETRON 4 MG/1
8 TABLET, ORALLY DISINTEGRATING ORAL EVERY 8 HOURS PRN
Qty: 30 TABLET | Refills: 2 | Status: SHIPPED | OUTPATIENT
Start: 2024-09-03

## 2024-09-03 NOTE — PROGRESS NOTES
Reason for visit: Routine OB visit at 27w3d     CC:  She is feeling well. Endorses good fetal movement. Denies VB, LOF, contractions, h/a, visual changes, and right upper quadrant pain.     ROS: All systems reviewed and are negative with exception of the following: amenorrhea    Weight 119 kg (262 lb); /72; ; FH 29 cm   Total weight gain: 0 kg (0 lb) with Total weight gain expected 5 kg (11 lb)-9 kg (19 lb)    Urine today and reviewed: negative glucose, negative protein    24-week Anatomy scan: EFW 37%; normal; anterior placenta without evidence of placenta previa    Exam:  General Appearance:  No visualized signs of distress. Normal mood and behavior  Cardiorespiratory: HR str and reg. Lungs clear. Resp even and unlabored.  Abdomen: is soft and nontender. No CVA tenderness. Uterus is consistent with estimated gestational age.  Ext: No edema. Calves non-tender.     Impression  Diagnoses and all orders for this visit:    1. 27 weeks gestation of pregnancy (Primary)  Discussed choosing a pediatrician, childbirth education,  breastfeeding, and breast vs formula feeding. Discussed and encouraged practicing measures of relaxation during the birthing experience. Introduction to Pain Management During Labor and Delivery video included in the AVS.  -     Gestational Screen 1 Hr ([facility])  -     Hemoglobin  -     RPR Qualitative with Reflex to Quant  -     POC Urinalysis Dipstick  -     Hepatitis B Surface Antigen  -     HCV Antibody Rfx To Qnt PCR  -     Misc. Devices (Breast Pump) misc; Use 1 Device As Needed (for breastfeeding).  Dispense: 1 each; Refill: 0  -     Tdap Vaccine => 8yo IM (BOOSTRIX/ADACEL)    2. Multigravida in second trimester  Discussed third trimester of pregnancy, including discomforts and measures of support,  labor warnings, preeclampsia warnings, and signs and symptoms to report. Discussed glucose and hemoglobin screenings today in the clinic. Labs ordered today. Patient  to notify provider and/or come to the hospital for vaginal bleeding, leaking of fluid, contractions, or other concerns. Third Trimester of Pregnancy video included in the AVS.    3. Nausea and vomiting during pregnancy  Reviewed nonpharmacologic measures of support for nausea/vomiting in pregnancy. Prescription refill discssed and sent to pharmacy.   -     ondansetron ODT (ZOFRAN-ODT) 4 MG disintegrating tablet; Place 2 tablets on the tongue Every 8 (Eight) Hours As Needed for Nausea or Vomiting.  Dispense: 30 tablet; Refill: 2    4. Maternal cardiovascular disease affecting pregnancy, antepartum  Plan to begin twice weekly  testing at 32 weeks gestation. Has an interval growth with [facility] 9/10/2024.    5. Special screening examination for diabetes mellitus  -     Gestational Screen 1 Hr ([facility])    6. Screening for deficiency anemia  -     Hemoglobin    7. Screening examination for STI  -     RPR Qualitative with Reflex to Quant  -     Hepatitis B Surface Antigen  -     HCV Antibody Rfx To Qnt PCR    8. Encounter for antepartum consultation regarding lactation  Discussed benefits of breastfeeding,  breastfeeding classes, lactation support group, and outpatient lactation department. Prescription sent to pharmacy.  -     Misc. Devices (Breast Pump) misc; Use 1 Device As Needed (for breastfeeding).  Dispense: 1 each; Refill: 0    9. Need for Tdap vaccination  Reviewed Tdap immunization recommendations during pregnancy. She consents to receive the Tdap immunization during this prenatal visit. Tdap (Tetanus, Diptheria, Pertussis) Vaccine: What You Need to Know (VIS) education included in the AVS.  -     Tdap Vaccine => 8yo IM (BOOSTRIX/ADACEL)              Return to the office in 2 weeks for routine prenatal visit and as needed with concerns.        This note has been signed electronically.    Kaelyn Rogers, DNP, APRN, CNM, RNC-OB

## 2024-09-04 LAB
GLUCOSE 1H P 50 G GLC PO SERPL-MCNC: 261 MG/DL (ref 70–139)
HBV SURFACE AG SERPL QL IA: NEGATIVE
HCV AB SERPL QL IA: NORMAL
HCV IGG SERPL QL IA: NON REACTIVE
HGB BLD-MCNC: 10.9 G/DL (ref 11.1–15.9)
RPR SER QL: NON REACTIVE

## 2024-09-05 DIAGNOSIS — O24.419 GESTATIONAL DIABETES MELLITUS (GDM) AFFECTING PREGNANCY, ANTEPARTUM: Primary | ICD-10-CM

## 2024-09-05 PROBLEM — O24.919: Status: ACTIVE | Noted: 2024-09-05

## 2024-09-05 RX ORDER — BLOOD-GLUCOSE METER
1 EACH MISCELLANEOUS TAKE AS DIRECTED
Qty: 1 KIT | Refills: 0 | Status: SHIPPED | OUTPATIENT
Start: 2024-09-05

## 2024-09-17 ENCOUNTER — ROUTINE PRENATAL (OUTPATIENT)
Age: 24
End: 2024-09-17
Payer: COMMERCIAL

## 2024-09-17 VITALS — SYSTOLIC BLOOD PRESSURE: 122 MMHG | BODY MASS INDEX: 41.35 KG/M2 | WEIGHT: 264 LBS | DIASTOLIC BLOOD PRESSURE: 82 MMHG

## 2024-09-17 DIAGNOSIS — I25.10 MATERNAL CARDIOVASCULAR DISEASE AFFECTING PREGNANCY, ANTEPARTUM: ICD-10-CM

## 2024-09-17 DIAGNOSIS — O24.410 DIET CONTROLLED GESTATIONAL DIABETES MELLITUS (GDM) IN THIRD TRIMESTER: Primary | ICD-10-CM

## 2024-09-17 DIAGNOSIS — O99.419 MATERNAL CARDIOVASCULAR DISEASE AFFECTING PREGNANCY, ANTEPARTUM: ICD-10-CM

## 2024-09-17 PROCEDURE — 0502F SUBSEQUENT PRENATAL CARE: CPT | Performed by: OBSTETRICS & GYNECOLOGY

## 2024-09-17 RX ORDER — LANCETS 30 GAUGE
EACH MISCELLANEOUS
Qty: 200 EACH | Refills: 2 | Status: SHIPPED | OUTPATIENT
Start: 2024-09-17

## 2024-09-18 DIAGNOSIS — L08.9 INFECTED SKIN LESION: Primary | ICD-10-CM

## 2024-09-18 RX ORDER — CLINDAMYCIN HCL 300 MG
300 CAPSULE ORAL 3 TIMES DAILY
Qty: 15 CAPSULE | Refills: 0 | Status: SHIPPED | OUTPATIENT
Start: 2024-09-18 | End: 2024-09-23

## 2024-10-01 ENCOUNTER — ROUTINE PRENATAL (OUTPATIENT)
Age: 24
End: 2024-10-01
Payer: COMMERCIAL

## 2024-10-01 VITALS — DIASTOLIC BLOOD PRESSURE: 74 MMHG | BODY MASS INDEX: 40.72 KG/M2 | SYSTOLIC BLOOD PRESSURE: 118 MMHG | WEIGHT: 260 LBS

## 2024-10-01 DIAGNOSIS — I25.10 MATERNAL CARDIOVASCULAR DISEASE AFFECTING PREGNANCY, ANTEPARTUM: ICD-10-CM

## 2024-10-01 DIAGNOSIS — O24.410 DIET CONTROLLED GESTATIONAL DIABETES MELLITUS (GDM) IN THIRD TRIMESTER: ICD-10-CM

## 2024-10-01 DIAGNOSIS — Z34.83 MULTIGRAVIDA IN THIRD TRIMESTER: ICD-10-CM

## 2024-10-01 DIAGNOSIS — Z3A.31 31 WEEKS GESTATION OF PREGNANCY: Primary | ICD-10-CM

## 2024-10-01 DIAGNOSIS — Z71.85 IMMUNIZATION COUNSELING: ICD-10-CM

## 2024-10-01 DIAGNOSIS — O99.419 MATERNAL CARDIOVASCULAR DISEASE AFFECTING PREGNANCY, ANTEPARTUM: ICD-10-CM

## 2024-10-01 DIAGNOSIS — Z23 FLU VACCINE NEED: ICD-10-CM

## 2024-10-01 LAB
GLUCOSE UR STRIP-MCNC: NEGATIVE MG/DL
PROT UR STRIP-MCNC: NEGATIVE MG/DL

## 2024-10-01 PROCEDURE — 0502F SUBSEQUENT PRENATAL CARE: CPT | Performed by: ADVANCED PRACTICE MIDWIFE

## 2024-10-01 PROCEDURE — 90471 IMMUNIZATION ADMIN: CPT | Performed by: ADVANCED PRACTICE MIDWIFE

## 2024-10-01 PROCEDURE — 90656 IIV3 VACC NO PRSV 0.5 ML IM: CPT | Performed by: ADVANCED PRACTICE MIDWIFE

## 2024-10-01 NOTE — PROGRESS NOTES
Reason for visit: Routine OB visit at 31w3d     CC:  She had a stress event last week. Endorses good fetal movement. Denies VB, LOF, contractions, h/a, visual changes, and right upper quadrant pain.     ROS: All systems reviewed and are negative with exception of the following: amenorrhea, round ligament pain, left side sciatic nerve pain    Weight 118 kg (260 lb); /74; ; FH 32 cm   Total weight gain: -0.907 kg (-2 lb) with Total weight gain expected 5 kg (11 lb)-9 kg (19 lb)    Urine today and reviewed: negative glucose, negative protein    28-week anatomy scan: EFW 38%; AC 33%; HUNG 17.1 cm, DVP 5.5 cm; normal interval growth; no structural anomalies; vertex; anterior placenta - no previa    Exam:  General Appearance:  No visualized signs of distress. Normal mood and behavior  Cardiorespiratory: HR str and reg. Lungs clear. Resp even and unlabored.  Abdomen: is soft and nontender. No CVA tenderness. Uterus is consistent with estimated gestational age.  Ext: No edema. Calves non-tender.     Impression  Diagnoses and all orders for this visit:    1. 31 weeks gestation of pregnancy (Primary)  Discussed and encouraged practicing measures of relaxation during the birthing experience. Introduction to Pain Management During Labor and Delivery video included in the AVS. She has signed up for the  breastfeeding classes. She is planning to sign up for childbirth education class.   -     POC Urinalysis Dipstick    2. Multigravida in third trimester  Discussed third trimester of pregnancy, discomforts and measures of support, fetal movement counts,  labor warnings, preeclampsia warnings, and signs and symptoms to report. Discussed and encouraged to come to the hospital or call with vaginal bleeding, leaking of fluid, contractions, or other concerns. Third Trimester of Pregnancy, Signs and Symptoms of Labor, and Alternative Pain Management During Labor and Delivery videos included in the AVS.    3.  Diet controlled gestational diabetes mellitus (GDM) in third trimester  Fasting always less than 100. One outlier noted to be 170 after eating pizza, but usually less than 140. Plan for twice weekly  testing beginning next week. Normal interval growth at 28 weeks.     4. Maternal cardiovascular disease affecting pregnancy, antepartum  Denies chest pain or shortness of breath.     5. Immunization counseling  Discussed RSV recommendations during pregnancy, including consideration of receiving immunization after at least 32 weeks gestation and prior to 37 weeks gestation during RSV season. She is to consider receiving the RSV immunization next visit. Respiratory Syncytial Virus (RSV) Vaccine Injection education included in the AVS.     Discussed influenza vaccine recommendations during pregnancy. Patient consents to receive the influenza immunization this prenatal visit. Reviewed immune health practices and self-care measures to promote well-being. Encouraged screening and testing if patient experiences influenza-like illness symptoms for treatment with Tamiflu if indicated. Influenza (Flu) Vaccine (Inactivated or Recombinant): What You Need to Know (VIS) education included in the AVS.    6. Flu vaccine need  -     Fluzone >6mos ([id number])        Return to the office in 1 weeks for routine prenatal visit with BPP for GDM and maternal cardiac disease in pregnancy and as needed with concerns.        This note has been signed electronically.    Kaelyn Rogers, DNP, APRN, CNM, RNC-OB

## 2024-10-10 ENCOUNTER — ROUTINE PRENATAL (OUTPATIENT)
Dept: OBSTETRICS AND GYNECOLOGY | Age: 24
End: 2024-10-10
Payer: COMMERCIAL

## 2024-10-10 VITALS — DIASTOLIC BLOOD PRESSURE: 78 MMHG | SYSTOLIC BLOOD PRESSURE: 114 MMHG | BODY MASS INDEX: 40.13 KG/M2 | WEIGHT: 256.2 LBS

## 2024-10-10 DIAGNOSIS — Z71.85 IMMUNIZATION COUNSELING: ICD-10-CM

## 2024-10-10 DIAGNOSIS — Z34.83 MULTIGRAVIDA IN THIRD TRIMESTER: ICD-10-CM

## 2024-10-10 DIAGNOSIS — O24.410 DIET CONTROLLED GESTATIONAL DIABETES MELLITUS (GDM) IN THIRD TRIMESTER: ICD-10-CM

## 2024-10-10 DIAGNOSIS — Z3A.32 32 WEEKS GESTATION OF PREGNANCY: Primary | ICD-10-CM

## 2024-10-10 LAB
GLUCOSE UR STRIP-MCNC: NEGATIVE MG/DL
PROT UR STRIP-MCNC: NEGATIVE MG/DL

## 2024-10-10 RX ORDER — AZITHROMYCIN 250 MG
CAPSULE ORAL EVERY 24 HOURS
COMMUNITY
Start: 2024-10-07 | End: 2024-10-12

## 2024-10-10 NOTE — PROGRESS NOTES
Patient having more stretching pain, and indicates with her hands that it is on both sides of her growing belly.  No contractions.  No LOF or VB  Good FM  BPP today 8/8, HUNG 16  FSBS well-controlled.  Log reviewed.  Fasting almost all in 80's, highest 98.  Post-prandial 102-151  Already had flu shot.  Patient wants to do RSV vaccine next week since she has had some congestion this week.    Diagnoses and all orders for this visit:    1. 32 weeks gestation of pregnancy (Primary)  -     POC Urinalysis Dipstick    2. Multigravida in third trimester    3. Diet controlled gestational diabetes mellitus (GDM) in third trimester: Discussed plan to deliver at 39 weeks.  BPP reviewed.    4. Immunization counseling: RSV next visit  RTO in one week

## 2024-10-15 ENCOUNTER — ROUTINE PRENATAL (OUTPATIENT)
Dept: OBSTETRICS AND GYNECOLOGY | Age: 24
End: 2024-10-15
Payer: COMMERCIAL

## 2024-10-15 VITALS — WEIGHT: 258.2 LBS | SYSTOLIC BLOOD PRESSURE: 124 MMHG | DIASTOLIC BLOOD PRESSURE: 84 MMHG | BODY MASS INDEX: 40.44 KG/M2

## 2024-10-15 DIAGNOSIS — Z71.85 IMMUNIZATION COUNSELING: ICD-10-CM

## 2024-10-15 DIAGNOSIS — Z3A.33 33 WEEKS GESTATION OF PREGNANCY: Primary | ICD-10-CM

## 2024-10-15 DIAGNOSIS — O24.410 DIET CONTROLLED GESTATIONAL DIABETES MELLITUS (GDM) IN THIRD TRIMESTER: ICD-10-CM

## 2024-10-15 LAB
GLUCOSE UR STRIP-MCNC: NEGATIVE MG/DL
PROT UR STRIP-MCNC: ABNORMAL MG/DL

## 2024-10-15 RX ORDER — MORPHINE SULFATE 10 MG/ML
2 INJECTION INTRAMUSCULAR; INTRAVENOUS; SUBCUTANEOUS
OUTPATIENT
Start: 2024-10-15 | End: 2024-10-20

## 2024-10-15 RX ORDER — ACETAMINOPHEN 325 MG/1
650 TABLET ORAL EVERY 4 HOURS PRN
OUTPATIENT
Start: 2024-10-15

## 2024-10-15 RX ORDER — SODIUM CHLORIDE, SODIUM LACTATE, POTASSIUM CHLORIDE, CALCIUM CHLORIDE 600; 310; 30; 20 MG/100ML; MG/100ML; MG/100ML; MG/100ML
125 INJECTION, SOLUTION INTRAVENOUS CONTINUOUS
OUTPATIENT
Start: 2024-10-15 | End: 2024-10-17

## 2024-10-15 RX ORDER — MISOPROSTOL 100 UG/1
800 TABLET ORAL ONCE
OUTPATIENT
Start: 2024-10-15 | End: 2024-10-15

## 2024-10-15 RX ORDER — ONDANSETRON 4 MG/1
4 TABLET, ORALLY DISINTEGRATING ORAL EVERY 6 HOURS PRN
OUTPATIENT
Start: 2024-10-15

## 2024-10-15 RX ORDER — TERBUTALINE SULFATE 1 MG/ML
0.25 INJECTION, SOLUTION SUBCUTANEOUS AS NEEDED
OUTPATIENT
Start: 2024-10-15

## 2024-10-15 RX ORDER — OXYTOCIN/0.9 % SODIUM CHLORIDE 30/500 ML
250 PLASTIC BAG, INJECTION (ML) INTRAVENOUS CONTINUOUS
OUTPATIENT
Start: 2024-10-15 | End: 2024-10-15

## 2024-10-15 RX ORDER — OXYTOCIN/0.9 % SODIUM CHLORIDE 30/500 ML
2-20 PLASTIC BAG, INJECTION (ML) INTRAVENOUS
OUTPATIENT
Start: 2024-10-15

## 2024-10-15 RX ORDER — HYDROMORPHONE HYDROCHLORIDE 1 MG/ML
1 INJECTION, SOLUTION INTRAMUSCULAR; INTRAVENOUS; SUBCUTANEOUS
OUTPATIENT
Start: 2024-10-15 | End: 2024-10-25

## 2024-10-15 RX ORDER — SODIUM CHLORIDE 9 MG/ML
40 INJECTION, SOLUTION INTRAVENOUS AS NEEDED
OUTPATIENT
Start: 2024-10-15 | End: 2024-10-16

## 2024-10-15 RX ORDER — IBUPROFEN 200 MG
600 TABLET ORAL EVERY 6 HOURS PRN
OUTPATIENT
Start: 2024-10-15

## 2024-10-15 RX ORDER — ONDANSETRON 2 MG/ML
4 INJECTION INTRAMUSCULAR; INTRAVENOUS EVERY 6 HOURS PRN
OUTPATIENT
Start: 2024-10-15

## 2024-10-15 RX ORDER — SODIUM CHLORIDE 0.9 % (FLUSH) 0.9 %
10 SYRINGE (ML) INJECTION EVERY 12 HOURS SCHEDULED
OUTPATIENT
Start: 2024-10-15

## 2024-10-15 RX ORDER — SODIUM CHLORIDE 0.9 % (FLUSH) 0.9 %
10 SYRINGE (ML) INJECTION AS NEEDED
OUTPATIENT
Start: 2024-10-15

## 2024-10-15 RX ORDER — OXYTOCIN/0.9 % SODIUM CHLORIDE 30/500 ML
999 PLASTIC BAG, INJECTION (ML) INTRAVENOUS ONCE
OUTPATIENT
Start: 2024-10-15 | End: 2024-10-15

## 2024-10-15 RX ORDER — CARBOPROST TROMETHAMINE 250 UG/ML
250 INJECTION, SOLUTION INTRAMUSCULAR AS NEEDED
OUTPATIENT
Start: 2024-10-15

## 2024-10-15 RX ORDER — CALCIUM CARBONATE 500 MG/1
2 TABLET, CHEWABLE ORAL 3 TIMES DAILY PRN
OUTPATIENT
Start: 2024-10-15

## 2024-10-15 RX ORDER — CITRIC ACID/SODIUM CITRATE 334-500MG
30 SOLUTION, ORAL ORAL ONCE AS NEEDED
OUTPATIENT
Start: 2024-10-15

## 2024-10-15 RX ORDER — METHYLERGONOVINE MALEATE 0.2 MG/ML
200 INJECTION INTRAVENOUS ONCE AS NEEDED
OUTPATIENT
Start: 2024-10-15

## 2024-10-15 NOTE — PROGRESS NOTES
Chief Complaint   Patient presents with    Routine Prenatal Visit     Patient here for routine prenatal visit. Denies any contractions, bleeding and leakage of fluid. Reports good fetal movement.  BPP : 8/8  Pt requesting RSV vaccine.     No complaints. Endorses appropriate fetal movement. Denies regular contractions, leakage of fluid, vaginal bleeding or discharge.    Total weight gain: -1.724 kg (-3 lb 12.8 oz)  Expected weight gain: 5 kg (11 lb)-9 kg (19 lb)  /84   Wt 117 kg (258 lb 3.2 oz)   LMP 02/18/2024 (Exact Date)   BMI 40.44 kg/m²     Glucoses  Fastings: <95 past two weeks  1-hour Postprandials  B: <145  L: two 150s, random  D: two >150, random    US today:  cephalic, anterior placenta, HUNG 14 cm (MVP 4.4 cm), BPP 8/8      Diagnoses and all orders for this visit:    1. 33 weeks gestation of pregnancy (Primary)  -     POC Urinalysis Dipstick    2. Immunization counseling    3. Diet controlled gestational diabetes mellitus (GDM) in third trimester  -     sodium chloride 0.9 % flush 10 mL  -     sodium chloride 0.9 % flush 10 mL  -     sodium chloride 0.9 % infusion 40 mL  -     lactated ringers bolus 1,000 mL  -     lactated ringers infusion  -     acetaminophen (TYLENOL) tablet 650 mg  -     Morphine injection 2 mg  -     Morphine injection 2 mg  -     ondansetron ODT (ZOFRAN-ODT) disintegrating tablet 4 mg  -     ondansetron (ZOFRAN) injection 4 mg  -     terbutaline (BRETHINE) injection 0.25 mg  -     Sod Citrate-Citric Acid (BICITRA) oral solution 30 mL  -     ibuprofen (ADVIL,MOTRIN) tablet 600 mg  -     HYDROmorphone PF (DILAUDID) injection 1 mg  -     methylergonovine (METHERGINE) injection 200 mcg  -     carboprost (HEMABATE) injection 250 mcg  -     miSOPROStol (CYTOTEC) tablet 800 mcg  -     ondansetron ODT (ZOFRAN-ODT) disintegrating tablet 4 mg  -     ondansetron (ZOFRAN) injection 4 mg  -     calcium carbonate (TUMS) chewable tablet 500 mg (200 mg elemental)  -     oxytocin (PITOCIN) 30  units in 0.9% sodium chloride 500 mL (premix)  -     oxytocin (PITOCIN) 30 units in 0.9% sodium chloride 500 mL (premix)  -     oxytocin (PITOCIN) 30 units in 0.9% sodium chloride 500 mL (premix)    Other orders  -     ABRYSVO Vaccine (RSV for Pregnant Women 32-36 wks)  -     Admit To Obstetrics Inpatient; Standing  -     Code Status and Medical Interventions: CPR (Attempt to Resuscitate); Full Support; Standing  -     Obtain informed consent; Standing  -     Place Sequential Compression Device; Standing  -     Maintain Sequential Compression Device; Standing  -     Vital Signs q 4 while awake; Standing  -     Vital Signs Per Hospital Policy; Standing  -     Confirm presence of amniotic fluid; Standing  -     Continuous Fetal Monitoring With NST on Admission and Prior to Initiation of Oxytocin.; Standing  -     External Uterine Contraction Monitoring; Standing  -     Notify Physician (specified); Standing  -     Notify physician for tachysystole (per hospital algorithm); Standing  -     Notify physician if membranes ruptured, bleeding greater than 1 pad an hour, fetal heart tone abnormality, and severe pain; Standing  -     Up Ad Joyce; Standing  -     Intake and Output; Standing  -     Cervical Exam; Standing  -     Initiate Group Beta Strep (GBS) Prophylaxis Protocol, If Criteria Met; Standing  -     Position Change - For Intra-Uterine Resusitation for Hypertonus, HyperStimulation or Non-Reassuring Fetal Status; Standing  -     NPO Diet NPO Type: Ice Chips; Standing  -     Inpatient Anesthesiology Consult; Standing  -     CBC (No Diff); Standing  -     Treponema pallidum AB w/Reflex RPR; Standing  -     Type & Screen; Standing  -     Insert Peripheral IV; Standing  -     Saline Lock & Maintain IV Access; Standing  -     Notify Physician (specified); Standing  -     Vital Signs Per Hospital Policy; Standing  -     Up Ad Joyce; Standing  -     Strict Intake and Output; Standing  -     Fundal & Lochia Check; Standing  -      Fundal & Lochia Check; Standing  -     Apply Ice to Perineum; Standing  -     Bladder Assessment; Standing  -     Diet: Regular/House; Fluid Consistency: Thin (IDDSI 0); Standing      MFM each week as well  -ideally twice weekly BPPs (one with MFM and one with us)  Goal is 39 week delivery for A1gDM  Kick counts/Labor precautions  RTC 1 weeks

## 2024-10-22 ENCOUNTER — ROUTINE PRENATAL (OUTPATIENT)
Dept: OBSTETRICS AND GYNECOLOGY | Age: 24
End: 2024-10-22
Payer: COMMERCIAL

## 2024-10-22 VITALS — WEIGHT: 258.6 LBS | BODY MASS INDEX: 40.5 KG/M2 | DIASTOLIC BLOOD PRESSURE: 78 MMHG | SYSTOLIC BLOOD PRESSURE: 116 MMHG

## 2024-10-22 DIAGNOSIS — I25.10 MATERNAL CARDIOVASCULAR DISEASE AFFECTING PREGNANCY, ANTEPARTUM: ICD-10-CM

## 2024-10-22 DIAGNOSIS — R82.71 GBS BACTERIURIA: ICD-10-CM

## 2024-10-22 DIAGNOSIS — Z3A.34 34 WEEKS GESTATION OF PREGNANCY: Primary | ICD-10-CM

## 2024-10-22 DIAGNOSIS — O99.419 MATERNAL CARDIOVASCULAR DISEASE AFFECTING PREGNANCY, ANTEPARTUM: ICD-10-CM

## 2024-10-22 DIAGNOSIS — O24.410 DIET CONTROLLED GESTATIONAL DIABETES MELLITUS (GDM) IN THIRD TRIMESTER: ICD-10-CM

## 2024-10-22 DIAGNOSIS — O32.2XX0 TRANSVERSE LIE OF FETUS, SINGLE OR UNSPECIFIED FETUS: ICD-10-CM

## 2024-10-22 LAB
GLUCOSE UR STRIP-MCNC: NEGATIVE MG/DL
PROT UR STRIP-MCNC: NEGATIVE MG/DL

## 2024-10-22 NOTE — PROGRESS NOTES
Chief Complaint   Patient presents with    Routine Prenatal Visit     Patient here for routine prenatal visit. Denies regular contractions, bleeding and leakage of fluid. Reports good fetal movement.  BPP : 8/8     Fetal movement decreased. Better now but less than before. Denies regular contractions, leakage of fluid, vaginal bleeding or discharge.    Total weight gain: -1.542 kg (-3 lb 6.4 oz)  Expected weight gain: 5 kg (11 lb)-9 kg (19 lb)  /78   Wt 117 kg (258 lb 9.6 oz)   LMP 02/18/2024 (Exact Date)   BMI 40.50 kg/m²     Glucoses  Fastings: 2 elevated but secondary to not true fast and lack of sleep  1-hour Postprandials  B: normal  L: 1 elevated from wedding  D: normal    US today:  transverse lie, anterior placenta, HUNG 18.8 cm (MVP 5.9 cm), BPP 8/8    Diagnoses and all orders for this visit:    1. 34 weeks gestation of pregnancy (Primary)  -     POC Urinalysis Dipstick    2. Diet controlled gestational diabetes mellitus (GDM) in third trimester    3. Maternal cardiovascular disease affecting pregnancy, antepartum    4. GBS bacteriuria    5. Transverse lie of fetus, single or unspecified fetus      BPP reassuring  Check position at 36 weeks  A1gDM - stable, no changes at this time  Kick counts/Labor precautions  RTC 1 weeks

## 2024-10-30 ENCOUNTER — ROUTINE PRENATAL (OUTPATIENT)
Dept: OBSTETRICS AND GYNECOLOGY | Age: 24
End: 2024-10-30
Payer: COMMERCIAL

## 2024-10-30 VITALS — DIASTOLIC BLOOD PRESSURE: 74 MMHG | BODY MASS INDEX: 40.6 KG/M2 | SYSTOLIC BLOOD PRESSURE: 126 MMHG | WEIGHT: 259.2 LBS

## 2024-10-30 DIAGNOSIS — R82.71 GBS BACTERIURIA: ICD-10-CM

## 2024-10-30 DIAGNOSIS — Z3A.35 35 WEEKS GESTATION OF PREGNANCY: Primary | ICD-10-CM

## 2024-10-30 DIAGNOSIS — O24.410 DIET CONTROLLED GESTATIONAL DIABETES MELLITUS (GDM) IN THIRD TRIMESTER: ICD-10-CM

## 2024-10-30 LAB
GLUCOSE UR STRIP-MCNC: NEGATIVE MG/DL
PROT UR STRIP-MCNC: NEGATIVE MG/DL

## 2024-10-30 NOTE — PROGRESS NOTES
Chief Complaint   Patient presents with    Routine Prenatal Visit     Patient here for routine prenatal visit. Denies any contractions, bleeding and leakage of fluid. Reports good fetal movement.  BPP : 8/8     No complaints. Endorses appropriate fetal movement. Denies regular contractions, leakage of fluid, vaginal bleeding or discharge.    Total weight gain: -1.27 kg (-2 lb 12.8 oz)  Expected weight gain: 5 kg (11 lb)-9 kg (19 lb)  /74   Wt 118 kg (259 lb 3.2 oz)   LMP 02/18/2024 (Exact Date)   BMI 40.60 kg/m²     Glucoses  Fastings: <95  1-hour Postprandials  <140s    US today:  cephalic, anterior placenta, HUNG 15.6 cm (MVP 4.6 cm), BPP 8/8    Diagnoses and all orders for this visit:    1. 35 weeks gestation of pregnancy (Primary)  -     POC Urinalysis Dipstick    2. Diet controlled gestational diabetes mellitus (GDM) in third trimester    3. GBS bacteriuria      Encouraged to take more glucoses measurements  Kick counts/Labor precautions  RTC 1 weeks

## 2024-11-04 ENCOUNTER — ROUTINE PRENATAL (OUTPATIENT)
Age: 24
End: 2024-11-04
Payer: COMMERCIAL

## 2024-11-04 VITALS — SYSTOLIC BLOOD PRESSURE: 134 MMHG | WEIGHT: 260 LBS | BODY MASS INDEX: 40.72 KG/M2 | DIASTOLIC BLOOD PRESSURE: 82 MMHG

## 2024-11-04 DIAGNOSIS — O24.410 DIET CONTROLLED GESTATIONAL DIABETES MELLITUS (GDM) IN THIRD TRIMESTER: Primary | ICD-10-CM

## 2024-11-04 DIAGNOSIS — Z3A.36 36 WEEKS GESTATION OF PREGNANCY: ICD-10-CM

## 2024-11-04 DIAGNOSIS — Z3A.26 26 WEEKS GESTATION OF PREGNANCY: ICD-10-CM

## 2024-11-04 LAB
CLARITY, POC: CLEAR
COLOR UR: YELLOW
GLUCOSE UR STRIP-MCNC: NEGATIVE MG/DL
PROT UR STRIP-MCNC: ABNORMAL MG/DL

## 2024-11-04 PROCEDURE — 0502F SUBSEQUENT PRENATAL CARE: CPT | Performed by: OBSTETRICS & GYNECOLOGY

## 2024-11-04 NOTE — PROGRESS NOTES
Good fetal movement  Reports normal FSBS  US today BPP 8/8, HUNG 13.2cm  Cervix moderate, posterior  GBS and GC/Chl ordered and done  Labor instructions    Diagnoses and all orders for this visit:    1. Diet controlled gestational diabetes mellitus (GDM) in third trimester (Primary)    -     POC Urinalysis Dipstick    3. 36 weeks gestation of pregnancy  -     Chlamydia trachomatis, Neisseria gonorrhoeae, PCR w/ confirmation - Swab, Cervix  -     Strep B Screen - Swab, Vaginal/Rectum

## 2024-11-06 ENCOUNTER — HOSPITAL ENCOUNTER (OUTPATIENT)
Facility: HOSPITAL | Age: 24
Discharge: HOME | End: 2024-11-06
Attending: OBSTETRICS & GYNECOLOGY | Admitting: OBSTETRICS & GYNECOLOGY
Payer: COMMERCIAL

## 2024-11-06 VITALS
DIASTOLIC BLOOD PRESSURE: 83 MMHG | SYSTOLIC BLOOD PRESSURE: 120 MMHG | TEMPERATURE: 98.2 F | RESPIRATION RATE: 16 BRPM | HEART RATE: 92 BPM

## 2024-11-06 LAB
ABO GROUP BLD: NORMAL
ALBUMIN SERPL-MCNC: 3.2 G/DL (ref 3.5–5.2)
ALBUMIN/GLOB SERPL: 1.1 G/DL
ALP SERPL-CCNC: 192 U/L (ref 39–117)
ALT SERPL W P-5'-P-CCNC: 35 U/L (ref 1–33)
ANION GAP SERPL CALCULATED.3IONS-SCNC: 10 MMOL/L (ref 5–15)
AST SERPL-CCNC: 19 U/L (ref 1–32)
BILIRUB SERPL-MCNC: 0.2 MG/DL (ref 0–1.2)
BLD GP AB SCN SERPL QL: NEGATIVE
BUN SERPL-MCNC: 9 MG/DL (ref 6–20)
BUN/CREAT SERPL: 20.9 (ref 7–25)
CALCIUM SPEC-SCNC: 8.7 MG/DL (ref 8.6–10.5)
CHLORIDE SERPL-SCNC: 103 MMOL/L (ref 98–107)
CO2 SERPL-SCNC: 21 MMOL/L (ref 22–29)
CREAT SERPL-MCNC: 0.43 MG/DL (ref 0.57–1)
DEPRECATED RDW RBC AUTO: 41.8 FL (ref 37–54)
EGFRCR SERPLBLD CKD-EPI 2021: 139.5 ML/MIN/1.73
ERYTHROCYTE [DISTWIDTH] IN BLOOD BY AUTOMATED COUNT: 13.6 % (ref 12.3–15.4)
EXPIRATION DATE: NORMAL
GLOBULIN UR ELPH-MCNC: 2.9 GM/DL
GLUCOSE BLDC GLUCOMTR-MCNC: 85 MG/DL (ref 70–130)
GLUCOSE SERPL-MCNC: 85 MG/DL (ref 65–99)
HCT VFR BLD AUTO: 33.1 % (ref 34–46.6)
HGB BLD-MCNC: 11 G/DL (ref 12–15.9)
LDH SERPL-CCNC: 176 U/L (ref 135–214)
Lab: NORMAL
MCH RBC QN AUTO: 28.3 PG (ref 26.6–33)
MCHC RBC AUTO-ENTMCNC: 33.2 G/DL (ref 31.5–35.7)
MCV RBC AUTO: 85.1 FL (ref 79–97)
PLATELET # BLD AUTO: 184 10*3/MM3 (ref 140–450)
PMV BLD AUTO: 10.6 FL (ref 6–12)
POTASSIUM SERPL-SCNC: 3.4 MMOL/L (ref 3.5–5.2)
PROT SERPL-MCNC: 6.1 G/DL (ref 6–8.5)
PROT UR STRIP-MCNC: NEGATIVE MG/DL
RBC # BLD AUTO: 3.89 10*6/MM3 (ref 3.77–5.28)
RH BLD: POSITIVE
SODIUM SERPL-SCNC: 134 MMOL/L (ref 136–145)
T&S EXPIRATION DATE: NORMAL
URATE SERPL-MCNC: 4.7 MG/DL (ref 2.4–5.7)
WBC NRBC COR # BLD AUTO: 8.12 10*3/MM3 (ref 3.4–10.8)

## 2024-11-06 PROCEDURE — 84550 ASSAY OF BLOOD/URIC ACID: CPT | Performed by: NURSE PRACTITIONER

## 2024-11-06 PROCEDURE — 86901 BLOOD TYPING SEROLOGIC RH(D): CPT | Performed by: NURSE PRACTITIONER

## 2024-11-06 PROCEDURE — 81002 URINALYSIS NONAUTO W/O SCOPE: CPT | Performed by: OBSTETRICS & GYNECOLOGY

## 2024-11-06 PROCEDURE — 86900 BLOOD TYPING SEROLOGIC ABO: CPT | Performed by: NURSE PRACTITIONER

## 2024-11-06 PROCEDURE — 82570 ASSAY OF URINE CREATININE: CPT | Performed by: NURSE PRACTITIONER

## 2024-11-06 PROCEDURE — 80053 COMPREHEN METABOLIC PANEL: CPT | Performed by: NURSE PRACTITIONER

## 2024-11-06 PROCEDURE — 85027 COMPLETE CBC AUTOMATED: CPT | Performed by: NURSE PRACTITIONER

## 2024-11-06 PROCEDURE — 36415 COLL VENOUS BLD VENIPUNCTURE: CPT | Performed by: NURSE PRACTITIONER

## 2024-11-06 PROCEDURE — 84156 ASSAY OF PROTEIN URINE: CPT | Performed by: NURSE PRACTITIONER

## 2024-11-06 PROCEDURE — 83615 LACTATE (LD) (LDH) ENZYME: CPT | Performed by: NURSE PRACTITIONER

## 2024-11-06 PROCEDURE — G0463 HOSPITAL OUTPT CLINIC VISIT: HCPCS

## 2024-11-06 PROCEDURE — 86850 RBC ANTIBODY SCREEN: CPT | Performed by: NURSE PRACTITIONER

## 2024-11-06 PROCEDURE — 82948 REAGENT STRIP/BLOOD GLUCOSE: CPT

## 2024-11-06 PROCEDURE — G0378 HOSPITAL OBSERVATION PER HR: HCPCS

## 2024-11-06 NOTE — NURSING NOTE
Patient to LDR from Dr. Borden office for [facility] labs, Serial BP, and Monitoring.

## 2024-11-06 NOTE — CONSULTS
20 gauge 1.88 inch USGPIV placed in left forearm with 1 attempt(s). Labs drawn with initial IV start and given to JENNIFER Scherer.

## 2024-11-07 ENCOUNTER — PREP FOR SURGERY (OUTPATIENT)
Dept: OTHER | Facility: HOSPITAL | Age: 24
End: 2024-11-07
Payer: COMMERCIAL

## 2024-11-07 LAB
CREAT UR-MCNC: 62.7 MG/DL
PROT ?TM UR-MCNC: 7.9 MG/DL
PROT/CREAT UR: 126 MG/G CREA (ref 0–200)

## 2024-11-07 RX ORDER — MISOPROSTOL 100 MCG
25 TABLET ORAL
Status: CANCELLED | OUTPATIENT
Start: 2024-11-07 | End: 2024-11-08

## 2024-11-07 NOTE — NURSING NOTE
Education provided to patient on reasons to return to labor and delivery including increased frequency and intensity of contractions, sudden gush/leaking of fluid, vaginal bleeding, and decreased fetal movement. Dr. Irving is going to follow up with Dr. Katz about scheduling another BPP for this week as a follow up.

## 2024-11-07 NOTE — H&P
[facility]  HISTORY AND PHYSICAL, OBGYN    Patient Name: Mara Moreno  : 2000  MRN: [id number]  Primary Care Physician:  Taylor Wan PA  Date of admission: (Not on file)    Subjective   Subjective     Chief Complaint: No chief complaint on file.      HPI: Mara Moreno is a 24 y.o. year old  with an 2024, by Ultrasound presenting for induction of labor secondary to FGR and abnormal umbilical artery dopplers in the setting of gestational diabetes at 37 weeks gestation.     Prenatal care has been with Faizan Katz MD  It has been noteworthy for:  Fetal growth restriction  Abnormal umbilical artery dopplers, elevated  Gestational diabetes, diet controlled  GBS bacteruria  Maternal cardiovascular disease affecting pregnancy, h/o spontaneous v-fib  Cardiac defibrillator in place  Allergy to amoxicillin, penicillin, cephalosporin    ROS:  All systems were reviewed and negative except for:   -endorses appropriate fetal movement  -denies regular contractions, leakage of fluid or vaginal bleeding  -denies preeclampsia symptoms    Personal History     OB History    Para Term  AB Living   2 0 0 0 1 0   SAB IAB Ectopic Molar Multiple Live Births   1 0 0 0 0 0      # Outcome Date GA Lbr Jose Luis/2nd Weight Sex Type Anes PTL Lv   2 Current            1 SAB                Past Medical History:   Diagnosis Date    Abnormal ECG 10-    V-FIB cardiac arrested. Has a defibrillator.    Anxiety     Cardiac arrest     Depression     Fracture of 5th metatarsal     Left    Hypertension     Polycystic ovary syndrome     PONV (postoperative nausea and vomiting)     Urinary tract infection        Past Surgical History:   Procedure Laterality Date    CARDIAC DEFIBRILLATOR PLACEMENT      EYE SURGERY      15 months of age    TOE PERCUTANEOUS PINNING Left 2018    Procedure: PERCUTANEOUS SCREW FIXATION LEFT 5TH METATARSAL BASE;  Surgeon: Joseph Watkins,  MD;  Location: [facility] OR;  Service: Orthopedics    TONSILLECTOMY      WISDOM TOOTH EXTRACTION  2017       Family History: family history includes Breast cancer in her maternal grandmother; Hypertension in her father. Otherwise pertinent FHx was reviewed and not pertinent to current issue.    Social History:  reports that she has never smoked. She has never used smokeless tobacco. She reports current alcohol use. She reports that she does not use drugs.    Home Medications:  Breast Pump, Contour Next EZ, Microlet Lancets, famotidine, glucose blood, metoclopramide, nadolol, nitrofurantoin (macrocrystal-monohydrate), ondansetron ODT, prenatal vitamin 27-0.8, promethazine, and sertraline    Allergies:  Allergies   Allergen Reactions    Penicillins Swelling, Rash and Angioedema    Cefdinir Hives and Itching    Amoxicillin Rash       Objective   Objective     Vitals:   Temp:  [97.9 °F (36.6 °C)-98.2 °F (36.8 °C)] 98.2 °F (36.8 °C)  Heart Rate:  [] 92  Resp:  [16] 16  BP: (120-138)/(73-91) 120/83    Physical Exam     General: Well Developed, Well Nourished, not in acute distress   HEENT: normocephalic, atraumatic, conjunctiva non-icteric    Respiratory: non-labored, symmetrical chest rise   Gastrointestinal: gravid, soft, no TTP, no rebound tenderness or guarding to palpation, no palpable ctx   Neurologic: alert and oriented, CN II-XII grossly intact without focal deficit, DTRs 2+ lower extremities, no clonus   Psychiatric: normal mood, pleasant, cooperative  Musculoskeletal: negative calf tenderness, no lower extremity edema  Skin: warm & dry, no cyanosis, no jaundice      Imaging  US Fetal Biophysical Profile;Without Non-Stress Testing (11/04/2024 08:53)   Intrauterine pregnancy at 36w2d  Placental location: Anterior  Fetal position: Vertex  Biophysical profile: Reassuring  8/8  HUNG: 13.2 cm  Fetal heart rate: 159    11/6/2024 MFM ultrasound  Vertex, anterior placenta, HUNG 16.8 cm (MVP 5.5 cm)  EFW: 2396g (9% - AC  6%)  BPP 8/8  Umbilical artery dopplers: elevated  MCA doppler normal    Prenatal Labs  Lab Results   Component Value Date    HGB 11.0 (L) 2024    RUBELLAABIGG 3.11 2024    HEPBSAG Negative 2024    ABSCRN Negative 2024    [id number] Non Reactive 2024    HEPCVIRUSABY Non Reactive 2024     (H) 2024    URINECX Final report (A) 2024    CHLAMNAA Negative 2024    NGONORRHON Negative 2024         Result Review    Preelampsia Labs:    Results from last 7 days   Lab Units 24  1700 24  1619   WBC 10*3/mm3 8.12  --    HEMOGLOBIN g/dL 11.0*  --    HEMATOCRIT % 33.1*  --    PLATELETS 10*3/mm3 184  --    POTASSIUM mmol/L 3.4*  --    ALT (SGPT) U/L 35*  --    AST (SGOT) U/L 19  --    CREATININE mg/dL 0.43*  --    BILIRUBIN mg/dL 0.2  --    LDH U/L 176  --    URIC ACID mg/dL 4.7  --    PROT/CREAT RATIO UR mg/G Crea  --  126.0           Assessment & Plan   Assessment / Plan     Mara Moreno is a 24 y.o. year old  with an 2024, by Ultrasound presenting for induction of labor secondary to fetal growth restriction, abnormal umbilical artery dopplers in the setting of gestational diabetes at 37 weeks gestation per [facility] recommendations.      Pregnancy  Fetal growth restriction  Abnormal umbilical artery dopplers, elevated  Gestational diabetes, diet controlled  GBS bacteruria  Maternal cardiovascular disease affecting pregnancy, h/o spontaneous v-fib  Cardiac defibrillator in place  Allergy to amoxicillin, penicillin, cephalosporin    PLAN  -admit to L&D  -admit labs  -cytotec 25 mcg PO every 4 hours x 5 doses until cervix >2cm, then pitocin per protocol  -AROM PRN  -epidural at patient request  -glucose checks every 4 hours  -plan for     Risks and benefits of induction discussed. Patient understands that IOL increases the risk of  delivery over spontaneous labor, especially if the patient does not have a favorable cervix.       Faizan ROGERS  MD Gianna  11/7/2024  12:36 CST

## 2024-11-07 NOTE — H&P (VIEW-ONLY)
[facility]  HISTORY AND PHYSICAL, OBGYN    Patient Name: Mara Moreno  : 2000  MRN: [id number]  Primary Care Physician:  Taylor Wan PA  Date of admission: (Not on file)    Subjective   Subjective     Chief Complaint: No chief complaint on file.      HPI: Mara Moreno is a 24 y.o. year old  with an 2024, by Ultrasound presenting for induction of labor secondary to FGR and abnormal umbilical artery dopplers in the setting of gestational diabetes at 37 weeks gestation.     Prenatal care has been with Faizan Katz MD  It has been noteworthy for:  Fetal growth restriction  Abnormal umbilical artery dopplers, elevated  Gestational diabetes, diet controlled  GBS bacteruria  Maternal cardiovascular disease affecting pregnancy, h/o spontaneous v-fib  Cardiac defibrillator in place  Allergy to amoxicillin, penicillin, cephalosporin    ROS:  All systems were reviewed and negative except for:   -endorses appropriate fetal movement  -denies regular contractions, leakage of fluid or vaginal bleeding  -denies preeclampsia symptoms    Personal History     OB History    Para Term  AB Living   2 0 0 0 1 0   SAB IAB Ectopic Molar Multiple Live Births   1 0 0 0 0 0      # Outcome Date GA Lbr Jose Luis/2nd Weight Sex Type Anes PTL Lv   2 Current            1 SAB                Past Medical History:   Diagnosis Date    Abnormal ECG 10-    V-FIB cardiac arrested. Has a defibrillator.    Anxiety     Cardiac arrest     Depression     Fracture of 5th metatarsal     Left    Hypertension     Polycystic ovary syndrome     PONV (postoperative nausea and vomiting)     Urinary tract infection        Past Surgical History:   Procedure Laterality Date    CARDIAC DEFIBRILLATOR PLACEMENT      EYE SURGERY      15 months of age    TOE PERCUTANEOUS PINNING Left 2018    Procedure: PERCUTANEOUS SCREW FIXATION LEFT 5TH METATARSAL BASE;  Surgeon: Joseph Watkins,  MD;  Location: [facility] OR;  Service: Orthopedics    TONSILLECTOMY      WISDOM TOOTH EXTRACTION  2017       Family History: family history includes Breast cancer in her maternal grandmother; Hypertension in her father. Otherwise pertinent FHx was reviewed and not pertinent to current issue.    Social History:  reports that she has never smoked. She has never used smokeless tobacco. She reports current alcohol use. She reports that she does not use drugs.    Home Medications:  Breast Pump, Contour Next EZ, Microlet Lancets, famotidine, glucose blood, metoclopramide, nadolol, nitrofurantoin (macrocrystal-monohydrate), ondansetron ODT, prenatal vitamin 27-0.8, promethazine, and sertraline    Allergies:  Allergies   Allergen Reactions    Penicillins Swelling, Rash and Angioedema    Cefdinir Hives and Itching    Amoxicillin Rash       Objective   Objective     Vitals:   Temp:  [97.9 °F (36.6 °C)-98.2 °F (36.8 °C)] 98.2 °F (36.8 °C)  Heart Rate:  [] 92  Resp:  [16] 16  BP: (120-138)/(73-91) 120/83    Physical Exam     General: Well Developed, Well Nourished, not in acute distress   HEENT: normocephalic, atraumatic, conjunctiva non-icteric    Respiratory: non-labored, symmetrical chest rise   Gastrointestinal: gravid, soft, no TTP, no rebound tenderness or guarding to palpation, no palpable ctx   Neurologic: alert and oriented, CN II-XII grossly intact without focal deficit, DTRs 2+ lower extremities, no clonus   Psychiatric: normal mood, pleasant, cooperative  Musculoskeletal: negative calf tenderness, no lower extremity edema  Skin: warm & dry, no cyanosis, no jaundice      Imaging  US Fetal Biophysical Profile;Without Non-Stress Testing (11/04/2024 08:53)   Intrauterine pregnancy at 36w2d  Placental location: Anterior  Fetal position: Vertex  Biophysical profile: Reassuring  8/8  HUNG: 13.2 cm  Fetal heart rate: 159    11/6/2024 MFM ultrasound  Vertex, anterior placenta, HUNG 16.8 cm (MVP 5.5 cm)  EFW: 2396g (9% - AC  6%)  BPP 8/8  Umbilical artery dopplers: elevated  MCA doppler normal    Prenatal Labs  Lab Results   Component Value Date    HGB 11.0 (L) 2024    RUBELLAABIGG 3.11 2024    HEPBSAG Negative 2024    ABSCRN Negative 2024    [id number] Non Reactive 2024    HEPCVIRUSABY Non Reactive 2024     (H) 2024    URINECX Final report (A) 2024    CHLAMNAA Negative 2024    NGONORRHON Negative 2024         Result Review    Preelampsia Labs:    Results from last 7 days   Lab Units 24  1700 24  1619   WBC 10*3/mm3 8.12  --    HEMOGLOBIN g/dL 11.0*  --    HEMATOCRIT % 33.1*  --    PLATELETS 10*3/mm3 184  --    POTASSIUM mmol/L 3.4*  --    ALT (SGPT) U/L 35*  --    AST (SGOT) U/L 19  --    CREATININE mg/dL 0.43*  --    BILIRUBIN mg/dL 0.2  --    LDH U/L 176  --    URIC ACID mg/dL 4.7  --    PROT/CREAT RATIO UR mg/G Crea  --  126.0           Assessment & Plan   Assessment / Plan     Mara Moreno is a 24 y.o. year old  with an 2024, by Ultrasound presenting for induction of labor secondary to fetal growth restriction, abnormal umbilical artery dopplers in the setting of gestational diabetes at 37 weeks gestation per [facility] recommendations.      Pregnancy  Fetal growth restriction  Abnormal umbilical artery dopplers, elevated  Gestational diabetes, diet controlled  GBS bacteruria  Maternal cardiovascular disease affecting pregnancy, h/o spontaneous v-fib  Cardiac defibrillator in place  Allergy to amoxicillin, penicillin, cephalosporin    PLAN  -admit to L&D  -admit labs  -cytotec 25 mcg PO every 4 hours x 5 doses until cervix >2cm, then pitocin per protocol  -AROM PRN  -epidural at patient request  -glucose checks every 4 hours  -plan for     Risks and benefits of induction discussed. Patient understands that IOL increases the risk of  delivery over spontaneous labor, especially if the patient does not have a favorable cervix.       Faizan ROGERS  MD Gianna  11/7/2024  12:36 CST

## 2024-11-08 LAB
C TRACH RRNA SPEC QL NAA+PROBE: NEGATIVE
GP B STREP DNA SPEC QL NAA+PROBE: POSITIVE
N GONORRHOEA RRNA SPEC QL NAA+PROBE: NEGATIVE

## 2024-11-09 ENCOUNTER — HOSPITAL ENCOUNTER (OUTPATIENT)
Facility: HOSPITAL | Age: 24
Setting detail: OBSERVATION
Discharge: HOME | End: 2024-11-09
Attending: OBSTETRICS & GYNECOLOGY | Admitting: OBSTETRICS & GYNECOLOGY
Payer: COMMERCIAL

## 2024-11-09 ENCOUNTER — HOSPITAL ENCOUNTER (OUTPATIENT)
Dept: LABOR AND DELIVERY | Facility: HOSPITAL | Age: 24
Discharge: HOME | End: 2024-11-09
Payer: COMMERCIAL

## 2024-11-09 VITALS
DIASTOLIC BLOOD PRESSURE: 77 MMHG | SYSTOLIC BLOOD PRESSURE: 131 MMHG | TEMPERATURE: 98 F | WEIGHT: 262 LBS | RESPIRATION RATE: 18 BRPM | HEIGHT: 66 IN | HEART RATE: 90 BPM | BODY MASS INDEX: 42.11 KG/M2 | OXYGEN SATURATION: 99 %

## 2024-11-09 PROCEDURE — G0463 HOSPITAL OUTPT CLINIC VISIT: HCPCS

## 2024-11-09 PROCEDURE — G0378 HOSPITAL OBSERVATION PER HR: HCPCS

## 2024-11-09 PROCEDURE — 59025 FETAL NON-STRESS TEST: CPT

## 2024-11-09 NOTE — NURSING NOTE
Patient educated on reasons to return to labor and delivery including increased frequency and strength of contractions, sudden gush/leaking of fluid, vaginal bleeding, decreased fetal movement, blurry vision/spots before eyes, epigastric pain, and headache unrelieved with Tylenol.     Pt instructed to be back for induction at 1800 tomorrow for induction. Pt verbalizes understanding.

## 2024-11-09 NOTE — NON STRESS TEST
Mara Moreno, a  at 37w0d with an CHRIS of 2024, by Ultrasound, was seen at [facility] LABOR DELIVERY for a nonstress test.    Chief Complaint   Patient presents with    Non-stress Test       Patient Active Problem List   Diagnosis    Closed fracture of base of fifth metatarsal bone of left foot at metaphyseal-diaphyseal junction    Ventricular fibrillation    Syncope    Hypokalemia    Increased anion gap metabolic acidosis    Elevated liver enzymes    Cardiac defibrillator in place    Penicillin allergy    Urinary tract infection in mother during first trimester of pregnancy    Allergy to amoxicillin    Allergy to cephalosporin    Maternal cardiovascular disease affecting pregnancy in third trimester    Maternal cardiovascular disease affecting pregnancy, antepartum    Pregnancy    Multigravida    Diabetes mellitus affecting pregnancy, antepartum       Start Time: 1030  Stop Time: 1050    Interpretation A  Nonstress Test Interpretation A: Reactive  Comments A: verified with Dr. Gage and MOISES Cowan RN

## 2024-11-09 NOTE — PROGRESS NOTES
Gayle JONES Tiffanie  : 2000  MRN: [id number]  CSN: [id number]    Labor & Delivery [facility] progress note    Subjective   Chief Complaint: Patient here for fetal surveillance ordered by private MD. Patient scheduled for induction tomorrow for IUGR.     HPI: see above    History:    Prenatal Information:  Prenatal Results       Initial Prenatal Labs       Test Value Reference Range Date Time    Hemoglobin  12.5 g/dL 12.0 - 15.9 24 1414       12.5 g/dL 12.0 - 15.9 24 1502    Hematocrit  38.6 % 34.0 - 46.6 24 1414       37.9 % 34.0 - 46.6 24 1502    Platelets  324 10*3/mm3 140 - 450 24 1414       290 10*3/mm3 140 - 450 24 1502    Rubella IgG  3.11 index Immune >0.99 24 1414    Hepatitis B SAg  Negative  Negative 24 0829       Negative  Negative 24 0810       Negative  Negative 24 1414    Hepatitis C Ab  Non Reactive  Non Reactive 24 1414    RPR  Non Reactive  Non Reactive 24 0829       Non Reactive  Non Reactive 24 1414    T. Pallidum Ab   Non Reactive  Non Reactive 24 0810    ABO  A   24 1700    Rh  Positive   24 1700    Antibody Screen  Negative  Negative 24 1414    HIV  Non Reactive  Non Reactive 24 0810       Non Reactive  Non Reactive 24 1414    Urine Culture  Final report   24 0953       Final report   24 1414    Gonorrhea  Negative  Negative 24 0850       Negative  Negative 24 0814       Negative  Negative 24 1414    Chlamydia  Negative  Negative 24 0850       Negative  Negative 24 0814       Negative  Negative 24 1414    TSH        HgB A1c         Varicella IgG  474 index Immune >165 24 1414    Hemoglobinopathy Fractionation        Hemoglobinopathy (genetic testing)        Cystic fibrosis                   Fetal testing        Test Value Reference Range Date Time    NIPT        MSAFP        AFP-4                  2nd and 3rd Trimester        Test Value Reference Range Date Time    Hemoglobin (repeated)  11.0 g/dL 12.0 - 15.9 11/06/24 1700       10.9 g/dL 11.1 - 15.9 09/03/24 0829    Hematocrit (repeated)  33.1 % 34.0 - 46.6 11/06/24 1700    Platelets   184 10*3/mm3 140 - 450 11/06/24 1700       324 10*3/mm3 140 - 450 04/16/24 1414       290 10*3/mm3 140 - 450 03/30/24 1502    1 hour GTT   261 mg/dL 70 - 139 09/03/24 0829    Antibody Screen (repeated)  Negative   11/06/24 1700    3rd TM syphilis scrn (repeated)  RPR   Non Reactive  Non Reactive 09/03/24 0829    3rd TM syphilis scrn (repeated) TP-Ab  Non Reactive  Non Reactive 08/20/24 0810    3rd TM syphilis screen TB-Ab (FTA)  Non Reactive  Non Reactive 08/20/24 0810    Syphilis cascade test TP-Ab (EIA)        Syphilis cascade TPPA        GTT Fasting        GTT 1 Hr        GTT 2 Hr        GTT 3 Hr        Group B Strep  Positive  Negative 11/04/24 0850              Other testing        Test Value Reference Range Date Time    Parvo IgG         CMV IgG                   Drug Screening       Test Value Reference Range Date Time    Amphetamine Screen        Barbiturate Screen        Benzodiazepine Screen        Methadone Screen        Phencyclidine Screen        Opiates Screen        THC Screen        Cocaine Screen        Propoxyphene Screen        Buprenorphine Screen        Methamphetamine Screen        Oxycodone Screen        Tricyclic Antidepressants Screen                  Legend    ^: Historical                          External Prenatal Results       Pregnancy Outside Results - Transcribed From Office Records - See Scanned Records For Details       Test Value Date Time    ABO  A  11/06/24 1700    Rh  Positive  11/06/24 1700    Antibody Screen  Negative  11/06/24 1700       Negative  04/16/24 1414    Varicella IgG  474 index 04/16/24 1414    Rubella  3.11 index 04/16/24 1414    Hgb  11.0 g/dL 11/06/24 1700       10.9 g/dL 09/03/24 0829       12.5 g/dL 04/16/24 1414       12.5 g/dL 03/30/24 1502     Hct  33.1 % 24 1700       38.6 % 24 1414       37.9 % 24 1502    HgB A1c        1h GTT  261 mg/dL 24 0829    3h GTT Fasting       3h GTT 1 hour       3h GTT 2 hour       3h GTT 3 hour        Gonorrhea (discrete)  Negative  24 0850       Negative  24 0814       Negative  24 1414    Chlamydia (discrete)  Negative  24 0850       Negative  24 0814       Negative  24 1414    RPR  Non Reactive  24 0829       Non Reactive  24 1414    Syphils cascade: TP-Ab (FTA)  Non Reactive  24 0810    TP-Ab  Non Reactive  24 0810    TP-Ab (EIA)       TPPA       HBsAg  Negative  24 0829       Negative  24 0810       Negative  24 1414    Herpes Simplex Virus PCR       Herpes Simplex VIrus Culture       HIV  Non Reactive  24 0810       Non Reactive  24 1414    Hep C RNA Quant PCR       Hep C Antibody  Non Reactive  24 1414    AFP       NIPT       Cystic Fibroisis        Group B Strep  Positive  24 0850    GBS Susceptibility to Clindamycin       GBS Susceptibility to Erythromycin       Fetal Fibronectin       Genetic Testing, Maternal Blood                 Drug Screening       Test Value Date Time    Urine Drug Screen       Amphetamine Screen       Barbiturate Screen       Benzodiazepine Screen       Methadone Screen       Phencyclidine Screen       Opiates Screen       THC Screen       Cocaine Screen       Propoxyphene Screen       Buprenorphine Screen       Methamphetamine Screen       Oxycodone Screen       Tricyclic Antidepressants Screen                 Legend    ^: Historical                             Past OB History:     OB History    Para Term  AB Living   2 0 0 0 1 0   SAB IAB Ectopic Molar Multiple Live Births   1 0 0 0 0 0      # Outcome Date GA Lbr Jose Luis/2nd Weight Sex Type Anes PTL Lv   2 Current            1 SAB                Past Medical History: Past Medical History:   Diagnosis Date     Abnormal ECG 10-    V-FIB cardiac arrested. Has a defibrillator.    Anxiety     Cardiac arrest 2021    Depression 2018    Fracture of 5th metatarsal     Left    Hypertension     Polycystic ovary syndrome 2023    PONV (postoperative nausea and vomiting)     Urinary tract infection 2012      Past Surgical History Past Surgical History:   Procedure Laterality Date    CARDIAC DEFIBRILLATOR PLACEMENT      EYE SURGERY      15 months of age    TOE PERCUTANEOUS PINNING Left 07/18/2018    Procedure: PERCUTANEOUS SCREW FIXATION LEFT 5TH METATARSAL BASE;  Surgeon: Joseph Watkins MD;  Location: [facility] OR;  Service: Orthopedics    TONSILLECTOMY      WISDOM TOOTH EXTRACTION  2017      Family History: Family History   Problem Relation Age of Onset    Hypertension Father     Breast cancer Maternal Grandmother     Ovarian cancer Neg Hx     Uterine cancer Neg Hx     Melanoma Neg Hx     Colon cancer Neg Hx       Social History:  reports that she has never smoked. She has never used smokeless tobacco.   reports current alcohol use.   reports no history of drug use.           High Risk Medical Conditions/Complaints this visit: Gestational Diabetes - diet controlled, IUGR    Discussion of Management or Test Interpretation with physician/provider/healthcare provider: No    External Records Reviewed: Prenatal history in [facility] from [facility] OB provider - JOELLEN Katz reviewed, pregnancy complicated by: above    Review Previous Test Results: Prenatal labs in [facility] reviewed, history of: GBS+    Independent Historian: None    Social Determinants to Health: No drug, transportation or housing or other issues noted       Objective   Medical Decision Making:  Amount/Complexity of Data Reviewed  -Labs ordered - n/a  -Imaging ordered - n/a  -IV fluids given - n/a  Risk:  Prescription drug management - n/a  Drug therapy requiring intensive monitoring for toxicity - no  Decision to admit patient - no  Diagnosis/Treatment limited by social  determinants    Min/max vitals past 24 hours:  No data recorded   BP  Min: 131/77  Max: 131/77   Pulse  Min: 90  Max: 90   Resp  Min: 18  Max: 18        Independent Interpretation NST/FHT's: reassuring and category 1.  external monitors used   Cervix: was not checked.   Contractions:    Review of current test: results none    N/a       Final Diagnoses: Reassuring fetal surveillance       Assessment   IUP at 37w0d  Gestational Diabetes  IUGR  GBS+     Plan   Follow up as scheduled    Patria Gage MD  11/9/2024  11:17 CST

## 2024-11-10 ENCOUNTER — HOSPITAL ENCOUNTER (INPATIENT)
Facility: HOSPITAL | Age: 24
LOS: 3 days | Discharge: HOME | End: 2024-11-13
Attending: OBSTETRICS & GYNECOLOGY | Admitting: OBSTETRICS & GYNECOLOGY
Payer: COMMERCIAL

## 2024-11-10 ENCOUNTER — HOSPITAL ENCOUNTER (OUTPATIENT)
Dept: LABOR AND DELIVERY | Facility: HOSPITAL | Age: 24
Discharge: HOME | End: 2024-11-10
Payer: COMMERCIAL

## 2024-11-10 DIAGNOSIS — O24.410 DIET CONTROLLED GESTATIONAL DIABETES MELLITUS (GDM) IN THIRD TRIMESTER: ICD-10-CM

## 2024-11-10 PROBLEM — O24.419 GESTATIONAL DIABETES: Status: ACTIVE | Noted: 2024-11-10

## 2024-11-10 LAB
ABO GROUP BLD: NORMAL
BLD GP AB SCN SERPL QL: NEGATIVE
DEPRECATED RDW RBC AUTO: 42.5 FL (ref 37–54)
ERYTHROCYTE [DISTWIDTH] IN BLOOD BY AUTOMATED COUNT: 13.6 % (ref 12.3–15.4)
GLUCOSE BLDC GLUCOMTR-MCNC: 103 MG/DL (ref 70–130)
HCT VFR BLD AUTO: 31.5 % (ref 34–46.6)
HGB BLD-MCNC: 10.3 G/DL (ref 12–15.9)
MCH RBC QN AUTO: 28.4 PG (ref 26.6–33)
MCHC RBC AUTO-ENTMCNC: 32.7 G/DL (ref 31.5–35.7)
MCV RBC AUTO: 86.8 FL (ref 79–97)
PLATELET # BLD AUTO: 213 10*3/MM3 (ref 140–450)
PMV BLD AUTO: 10.8 FL (ref 6–12)
RBC # BLD AUTO: 3.63 10*6/MM3 (ref 3.77–5.28)
RH BLD: POSITIVE
T&S EXPIRATION DATE: NORMAL
WBC NRBC COR # BLD AUTO: 9.68 10*3/MM3 (ref 3.4–10.8)

## 2024-11-10 PROCEDURE — 86850 RBC ANTIBODY SCREEN: CPT | Performed by: OBSTETRICS & GYNECOLOGY

## 2024-11-10 PROCEDURE — 82948 REAGENT STRIP/BLOOD GLUCOSE: CPT

## 2024-11-10 PROCEDURE — 85027 COMPLETE CBC AUTOMATED: CPT | Performed by: OBSTETRICS & GYNECOLOGY

## 2024-11-10 PROCEDURE — 86780 TREPONEMA PALLIDUM: CPT | Performed by: OBSTETRICS & GYNECOLOGY

## 2024-11-10 PROCEDURE — 86901 BLOOD TYPING SEROLOGIC RH(D): CPT | Performed by: OBSTETRICS & GYNECOLOGY

## 2024-11-10 PROCEDURE — 86900 BLOOD TYPING SEROLOGIC ABO: CPT | Performed by: OBSTETRICS & GYNECOLOGY

## 2024-11-10 RX ORDER — MORPHINE SULFATE 2 MG/ML
2 INJECTION, SOLUTION INTRAMUSCULAR; INTRAVENOUS
Status: DISCONTINUED | OUTPATIENT
Start: 2024-11-10 | End: 2024-11-11 | Stop reason: HOSPADM

## 2024-11-10 RX ORDER — TERBUTALINE SULFATE 1 MG/ML
0.25 INJECTION, SOLUTION SUBCUTANEOUS AS NEEDED
Status: DISCONTINUED | OUTPATIENT
Start: 2024-11-10 | End: 2024-11-11 | Stop reason: HOSPADM

## 2024-11-10 RX ORDER — VANCOMYCIN 1.75 G/350ML
1250 INJECTION, SOLUTION INTRAVENOUS EVERY 8 HOURS
Status: DISCONTINUED | OUTPATIENT
Start: 2024-11-11 | End: 2024-11-11

## 2024-11-10 RX ORDER — MISOPROSTOL 100 MCG
25 TABLET ORAL
Status: DISCONTINUED | OUTPATIENT
Start: 2024-11-10 | End: 2024-11-11

## 2024-11-10 RX ORDER — CITRIC ACID/SODIUM CITRATE 334-500MG
30 SOLUTION, ORAL ORAL ONCE AS NEEDED
Status: DISCONTINUED | OUTPATIENT
Start: 2024-11-10 | End: 2024-11-11 | Stop reason: HOSPADM

## 2024-11-10 RX ORDER — OXYTOCIN/0.9 % SODIUM CHLORIDE 30/500 ML
2-20 PLASTIC BAG, INJECTION (ML) INTRAVENOUS
Status: DISCONTINUED | OUTPATIENT
Start: 2024-11-10 | End: 2024-11-11 | Stop reason: HOSPADM

## 2024-11-10 RX ORDER — NADOLOL 20 MG/1
20 TABLET ORAL
Status: DISCONTINUED | OUTPATIENT
Start: 2024-11-11 | End: 2024-11-11

## 2024-11-10 RX ORDER — MORPHINE SULFATE 2 MG/ML
2 INJECTION, SOLUTION INTRAMUSCULAR; INTRAVENOUS
Status: DISCONTINUED | OUTPATIENT
Start: 2024-11-10 | End: 2024-11-11 | Stop reason: SDUPTHER

## 2024-11-10 RX ORDER — ACETAMINOPHEN 325 MG/1
650 TABLET ORAL EVERY 4 HOURS PRN
Status: DISCONTINUED | OUTPATIENT
Start: 2024-11-10 | End: 2024-11-11 | Stop reason: HOSPADM

## 2024-11-10 RX ORDER — ONDANSETRON 4 MG/1
4 TABLET, ORALLY DISINTEGRATING ORAL EVERY 6 HOURS PRN
Status: DISCONTINUED | OUTPATIENT
Start: 2024-11-10 | End: 2024-11-11 | Stop reason: HOSPADM

## 2024-11-10 RX ORDER — ONDANSETRON 2 MG/ML
4 INJECTION INTRAMUSCULAR; INTRAVENOUS EVERY 6 HOURS PRN
Status: DISCONTINUED | OUTPATIENT
Start: 2024-11-10 | End: 2024-11-11 | Stop reason: HOSPADM

## 2024-11-10 RX ADMIN — Medication 25 MCG: at 19:43

## 2024-11-11 ENCOUNTER — ANESTHESIA (OUTPATIENT)
Dept: LABOR AND DELIVERY | Facility: HOSPITAL | Age: 24
End: 2024-11-11
Payer: COMMERCIAL

## 2024-11-11 ENCOUNTER — ANESTHESIA EVENT (OUTPATIENT)
Dept: LABOR AND DELIVERY | Facility: HOSPITAL | Age: 24
End: 2024-11-11
Payer: COMMERCIAL

## 2024-11-11 LAB
GLUCOSE BLDC GLUCOMTR-MCNC: 103 MG/DL (ref 70–130)
GLUCOSE BLDC GLUCOMTR-MCNC: 109 MG/DL (ref 70–130)
GLUCOSE BLDC GLUCOMTR-MCNC: 202 MG/DL (ref 70–130)
GLUCOSE BLDC GLUCOMTR-MCNC: 83 MG/DL (ref 70–130)
GLUCOSE BLDC GLUCOMTR-MCNC: 91 MG/DL (ref 70–130)
TREPONEMA PALLIDUM IGG+IGM AB [PRESENCE] IN SERUM OR PLASMA BY IMMUNOASSAY: NORMAL

## 2024-11-11 PROCEDURE — 25010000002 ONDANSETRON PER 1 MG: Performed by: OBSTETRICS & GYNECOLOGY

## 2024-11-11 PROCEDURE — 59400 OBSTETRICAL CARE: CPT | Performed by: OBSTETRICS & GYNECOLOGY

## 2024-11-11 PROCEDURE — 25810000003 SODIUM CHLORIDE 0.9 % SOLUTION 500 ML FLEX CONT: Performed by: OBSTETRICS & GYNECOLOGY

## 2024-11-11 PROCEDURE — 25010000002 VANCOMYCIN PER 500 MG: Performed by: OBSTETRICS & GYNECOLOGY

## 2024-11-11 PROCEDURE — 25010000002 MORPHINE PER 10 MG: Performed by: OBSTETRICS & GYNECOLOGY

## 2024-11-11 PROCEDURE — 0UQMXZZ REPAIR VULVA, EXTERNAL APPROACH: ICD-10-PCS | Performed by: OBSTETRICS & GYNECOLOGY

## 2024-11-11 PROCEDURE — 25010000002 VANCOMYCIN 1 G RECONSTITUTED SOLUTION 1 EACH VIAL: Performed by: OBSTETRICS & GYNECOLOGY

## 2024-11-11 PROCEDURE — 51702 INSERT TEMP BLADDER CATH: CPT

## 2024-11-11 PROCEDURE — 25010000002 ROPIVACAINE PER 1 MG: Performed by: NURSE ANESTHETIST, CERTIFIED REGISTERED

## 2024-11-11 PROCEDURE — 0HQ9XZZ REPAIR PERINEUM SKIN, EXTERNAL APPROACH: ICD-10-PCS | Performed by: OBSTETRICS & GYNECOLOGY

## 2024-11-11 PROCEDURE — 25810000003 LACTATED RINGERS PER 1000 ML: Performed by: OBSTETRICS & GYNECOLOGY

## 2024-11-11 PROCEDURE — C1755 CATHETER, INTRASPINAL: HCPCS | Performed by: NURSE ANESTHETIST, CERTIFIED REGISTERED

## 2024-11-11 PROCEDURE — 88307 TISSUE EXAM BY PATHOLOGIST: CPT | Performed by: OBSTETRICS & GYNECOLOGY

## 2024-11-11 PROCEDURE — 82948 REAGENT STRIP/BLOOD GLUCOSE: CPT

## 2024-11-11 RX ORDER — HYDROXYZINE HYDROCHLORIDE 25 MG/1
50 TABLET, FILM COATED ORAL NIGHTLY PRN
Status: DISCONTINUED | OUTPATIENT
Start: 2024-11-11 | End: 2024-11-13 | Stop reason: HOSPADM

## 2024-11-11 RX ORDER — IBUPROFEN 600 MG/1
600 TABLET, FILM COATED ORAL EVERY 6 HOURS PRN
Status: DISCONTINUED | OUTPATIENT
Start: 2024-11-11 | End: 2024-11-11 | Stop reason: HOSPADM

## 2024-11-11 RX ORDER — ONDANSETRON 2 MG/ML
4 INJECTION INTRAMUSCULAR; INTRAVENOUS EVERY 6 HOURS PRN
Status: DISCONTINUED | OUTPATIENT
Start: 2024-11-11 | End: 2024-11-11 | Stop reason: HOSPADM

## 2024-11-11 RX ORDER — OXYTOCIN/0.9 % SODIUM CHLORIDE 30/500 ML
125 PLASTIC BAG, INJECTION (ML) INTRAVENOUS ONCE AS NEEDED
Status: DISCONTINUED | OUTPATIENT
Start: 2024-11-11 | End: 2024-11-13 | Stop reason: HOSPADM

## 2024-11-11 RX ORDER — DOCUSATE SODIUM 100 MG/1
100 CAPSULE, LIQUID FILLED ORAL 2 TIMES DAILY
Status: DISCONTINUED | OUTPATIENT
Start: 2024-11-11 | End: 2024-11-13 | Stop reason: HOSPADM

## 2024-11-11 RX ORDER — TRAMADOL HYDROCHLORIDE 50 MG/1
50 TABLET ORAL EVERY 6 HOURS PRN
Status: DISCONTINUED | OUTPATIENT
Start: 2024-11-11 | End: 2024-11-13 | Stop reason: HOSPADM

## 2024-11-11 RX ORDER — ROPIVACAINE HYDROCHLORIDE 2 MG/ML
INJECTION, SOLUTION EPIDURAL; INFILTRATION; PERINEURAL AS NEEDED
Status: DISCONTINUED | OUTPATIENT
Start: 2024-11-11 | End: 2024-11-11 | Stop reason: SURG

## 2024-11-11 RX ORDER — CARBOPROST TROMETHAMINE 250 UG/ML
250 INJECTION, SOLUTION INTRAMUSCULAR AS NEEDED
Status: DISCONTINUED | OUTPATIENT
Start: 2024-11-11 | End: 2024-11-11 | Stop reason: HOSPADM

## 2024-11-11 RX ORDER — BISACODYL 10 MG
10 SUPPOSITORY, RECTAL RECTAL DAILY PRN
Status: DISCONTINUED | OUTPATIENT
Start: 2024-11-12 | End: 2024-11-13 | Stop reason: HOSPADM

## 2024-11-11 RX ORDER — NADOLOL 20 MG/1
20 TABLET ORAL DAILY
Status: DISCONTINUED | OUTPATIENT
Start: 2024-11-12 | End: 2024-11-11

## 2024-11-11 RX ORDER — OXYTOCIN/0.9 % SODIUM CHLORIDE 30/500 ML
999 PLASTIC BAG, INJECTION (ML) INTRAVENOUS ONCE
Status: DISCONTINUED | OUTPATIENT
Start: 2024-11-11 | End: 2024-11-11 | Stop reason: HOSPADM

## 2024-11-11 RX ORDER — DEXMEDETOMIDINE HYDROCHLORIDE 4 UG/ML
INJECTION, SOLUTION INTRAVENOUS AS NEEDED
Status: DISCONTINUED | OUTPATIENT
Start: 2024-11-11 | End: 2024-11-11 | Stop reason: SURG

## 2024-11-11 RX ORDER — OXYTOCIN/0.9 % SODIUM CHLORIDE 30/500 ML
250 PLASTIC BAG, INJECTION (ML) INTRAVENOUS CONTINUOUS
Status: DISCONTINUED | OUTPATIENT
Start: 2024-11-11 | End: 2024-11-11

## 2024-11-11 RX ORDER — IBUPROFEN 600 MG/1
600 TABLET, FILM COATED ORAL EVERY 6 HOURS
Status: DISCONTINUED | OUTPATIENT
Start: 2024-11-11 | End: 2024-11-13 | Stop reason: HOSPADM

## 2024-11-11 RX ORDER — CALCIUM CARBONATE 500 MG/1
2 TABLET, CHEWABLE ORAL 3 TIMES DAILY PRN
Status: DISCONTINUED | OUTPATIENT
Start: 2024-11-11 | End: 2024-11-11 | Stop reason: HOSPADM

## 2024-11-11 RX ORDER — SODIUM CHLORIDE 0.9 % (FLUSH) 0.9 %
10 SYRINGE (ML) INJECTION EVERY 12 HOURS SCHEDULED
Status: DISCONTINUED | OUTPATIENT
Start: 2024-11-11 | End: 2024-11-13 | Stop reason: HOSPADM

## 2024-11-11 RX ORDER — FAMOTIDINE 10 MG/ML
20 INJECTION, SOLUTION INTRAVENOUS EVERY 12 HOURS SCHEDULED
Status: DISCONTINUED | OUTPATIENT
Start: 2024-11-11 | End: 2024-11-11

## 2024-11-11 RX ORDER — PRENATAL VIT/IRON FUM/FOLIC AC 27MG-0.8MG
1 TABLET ORAL DAILY
Status: DISCONTINUED | OUTPATIENT
Start: 2024-11-11 | End: 2024-11-13 | Stop reason: HOSPADM

## 2024-11-11 RX ORDER — SODIUM CHLORIDE 0.9 % (FLUSH) 0.9 %
1-10 SYRINGE (ML) INJECTION AS NEEDED
Status: DISCONTINUED | OUTPATIENT
Start: 2024-11-11 | End: 2024-11-13 | Stop reason: HOSPADM

## 2024-11-11 RX ORDER — ONDANSETRON 2 MG/ML
4 INJECTION INTRAMUSCULAR; INTRAVENOUS EVERY 6 HOURS PRN
Status: DISCONTINUED | OUTPATIENT
Start: 2024-11-11 | End: 2024-11-13 | Stop reason: HOSPADM

## 2024-11-11 RX ORDER — SODIUM CHLORIDE 9 MG/ML
40 INJECTION, SOLUTION INTRAVENOUS AS NEEDED
Status: DISPENSED | OUTPATIENT
Start: 2024-11-11 | End: 2024-11-12

## 2024-11-11 RX ORDER — SODIUM CHLORIDE, SODIUM LACTATE, POTASSIUM CHLORIDE, CALCIUM CHLORIDE 600; 310; 30; 20 MG/100ML; MG/100ML; MG/100ML; MG/100ML
125 INJECTION, SOLUTION INTRAVENOUS CONTINUOUS
Status: DISCONTINUED | OUTPATIENT
Start: 2024-11-11 | End: 2024-11-13 | Stop reason: RX

## 2024-11-11 RX ORDER — METOPROLOL TARTRATE 25 MG/1
25 TABLET, FILM COATED ORAL NIGHTLY
Status: DISCONTINUED | OUTPATIENT
Start: 2024-11-11 | End: 2024-11-13 | Stop reason: HOSPADM

## 2024-11-11 RX ORDER — METHYLERGONOVINE MALEATE 0.2 MG/ML
200 INJECTION INTRAVENOUS ONCE AS NEEDED
Status: DISCONTINUED | OUTPATIENT
Start: 2024-11-11 | End: 2024-11-11 | Stop reason: HOSPADM

## 2024-11-11 RX ORDER — ONDANSETRON 4 MG/1
4 TABLET, ORALLY DISINTEGRATING ORAL EVERY 6 HOURS PRN
Status: DISCONTINUED | OUTPATIENT
Start: 2024-11-11 | End: 2024-11-11 | Stop reason: HOSPADM

## 2024-11-11 RX ORDER — MISOPROSTOL 200 UG/1
800 TABLET ORAL ONCE
Status: COMPLETED | OUTPATIENT
Start: 2024-11-11 | End: 2024-11-11

## 2024-11-11 RX ORDER — HYDROCORTISONE 25 MG/G
1 CREAM TOPICAL AS NEEDED
Status: DISCONTINUED | OUTPATIENT
Start: 2024-11-11 | End: 2024-11-13 | Stop reason: HOSPADM

## 2024-11-11 RX ORDER — SODIUM CHLORIDE 0.9 % (FLUSH) 0.9 %
10 SYRINGE (ML) INJECTION AS NEEDED
Status: DISCONTINUED | OUTPATIENT
Start: 2024-11-11 | End: 2024-11-13 | Stop reason: HOSPADM

## 2024-11-11 RX ORDER — SODIUM CHLORIDE, SODIUM LACTATE, POTASSIUM CHLORIDE, CALCIUM CHLORIDE 600; 310; 30; 20 MG/100ML; MG/100ML; MG/100ML; MG/100ML
50 INJECTION, SOLUTION INTRAVENOUS CONTINUOUS
Status: DISCONTINUED | OUTPATIENT
Start: 2024-11-11 | End: 2024-11-11

## 2024-11-11 RX ORDER — ACETAMINOPHEN 325 MG/1
650 TABLET ORAL EVERY 6 HOURS
Status: DISCONTINUED | OUTPATIENT
Start: 2024-11-11 | End: 2024-11-13 | Stop reason: HOSPADM

## 2024-11-11 RX ORDER — ONDANSETRON 4 MG/1
4 TABLET, ORALLY DISINTEGRATING ORAL EVERY 8 HOURS PRN
Status: DISCONTINUED | OUTPATIENT
Start: 2024-11-11 | End: 2024-11-13 | Stop reason: HOSPADM

## 2024-11-11 RX ORDER — LIDOCAINE HYDROCHLORIDE AND EPINEPHRINE 15; 5 MG/ML; UG/ML
INJECTION, SOLUTION EPIDURAL
Status: COMPLETED | OUTPATIENT
Start: 2024-11-11 | End: 2024-11-11

## 2024-11-11 RX ADMIN — FAMOTIDINE 20 MG: 10 INJECTION INTRAVENOUS at 08:12

## 2024-11-11 RX ADMIN — Medication: at 18:22

## 2024-11-11 RX ADMIN — ACETAMINOPHEN 650 MG: 325 TABLET ORAL at 19:49

## 2024-11-11 RX ADMIN — IBUPROFEN 600 MG: 600 TABLET, FILM COATED ORAL at 22:57

## 2024-11-11 RX ADMIN — Medication 25 MCG: at 00:11

## 2024-11-11 RX ADMIN — NADOLOL 20 MG: 20 TABLET ORAL at 00:11

## 2024-11-11 RX ADMIN — MISOPROSTOL 800 MCG: 200 TABLET ORAL at 15:39

## 2024-11-11 RX ADMIN — METOPROLOL TARTRATE 25 MG: 25 TABLET, FILM COATED ORAL at 20:45

## 2024-11-11 RX ADMIN — SERTRALINE HYDROCHLORIDE 100 MG: 100 TABLET ORAL at 00:11

## 2024-11-11 RX ADMIN — Medication 10 ML: at 20:45

## 2024-11-11 RX ADMIN — PRENATAL VIT W/ FE FUMARATE-FA TAB 27-0.8 MG 1 TABLET: 27-0.8 TAB at 20:45

## 2024-11-11 RX ADMIN — VANCOMYCIN HYDROCHLORIDE 2500 MG: 500 INJECTION, POWDER, LYOPHILIZED, FOR SOLUTION INTRAVENOUS at 08:12

## 2024-11-11 RX ADMIN — DEXMEDETOMIDINE HYDROCHLORIDE IN 0.9% SODIUM CHLORIDE 8 MCG: 4 INJECTION INTRAVENOUS at 13:28

## 2024-11-11 RX ADMIN — DOCUSATE SODIUM 100 MG: 100 CAPSULE, LIQUID FILLED ORAL at 20:45

## 2024-11-11 RX ADMIN — MORPHINE SULFATE 2 MG: 2 INJECTION, SOLUTION INTRAMUSCULAR; INTRAVENOUS at 11:54

## 2024-11-11 RX ADMIN — Medication 250 ML/HR: at 15:40

## 2024-11-11 RX ADMIN — ONDANSETRON 4 MG: 2 INJECTION INTRAMUSCULAR; INTRAVENOUS at 06:59

## 2024-11-11 RX ADMIN — ROPIVACAINE HYDROCHLORIDE 10 ML/HR: 2 INJECTION, SOLUTION EPIDURAL; INFILTRATION at 13:30

## 2024-11-11 RX ADMIN — LIDOCAINE HYDROCHLORIDE AND EPINEPHRINE 3 ML: 15; 5 INJECTION, SOLUTION EPIDURAL at 13:24

## 2024-11-11 RX ADMIN — IBUPROFEN 600 MG: 600 TABLET, FILM COATED ORAL at 15:39

## 2024-11-11 RX ADMIN — Medication 25 MCG: at 04:08

## 2024-11-11 RX ADMIN — ROPIVACAINE HYDROCHLORIDE 6 ML: 2 INJECTION, SOLUTION EPIDURAL; INFILTRATION at 13:28

## 2024-11-11 RX ADMIN — SODIUM CHLORIDE, POTASSIUM CHLORIDE, SODIUM LACTATE AND CALCIUM CHLORIDE 50 ML/HR: 600; 310; 30; 20 INJECTION, SOLUTION INTRAVENOUS at 07:02

## 2024-11-11 RX ADMIN — Medication 2 MILLI-UNITS/MIN: at 08:15

## 2024-11-11 RX ADMIN — ACETAMINOPHEN 650 MG: 325 TABLET ORAL at 07:19

## 2024-11-11 NOTE — NURSING NOTE
Pt sitting on edge of bed from [id number] for epidural. RN and CRNA at bedside. Difficulty tracing EFM and TOCO d/t pt position and body habitus.

## 2024-11-11 NOTE — INTERVAL H&P NOTE
H&P updated. The patient was examined and noted to be 1-2 cm dilated, 60%, -3 station, posterior, soft, vertex presentation. Now s/p cytotec x3. AROM performed with clear fluid. NST category 1, reactive and reassuring. Camargo with contractions every 3 minutes. Plan for . Vancomycin for GBS prophylaxis. Pitocin per protocol

## 2024-11-11 NOTE — PROGRESS NOTES
"Pharmacy Dosing Service  Pharmacokinetics  Vancomycin Initial Evaluation  Assessment/Action/Plan:  Loading dose?: 2500 mg IVPB once  Current Order: Vancomycin 1250 mg IVPB every 8 hours  Current end date:11/12/20  Levels: Obtain Vancomycin trough prior to dose on 11/12 at 0600  Additional antimicrobial agent(s): N/A  MRSA Nasal PCR ordered: No    Vancomycin dosage initiated based on population pharmacokinetic parameters. Pharmacy will continue to follow daily and adjust dose accordingly.     Subjective:  Mara Moreno is a 24 y.o. female with a Vancomycin \"Pharmacy to Dose\" consult for the treatment of Group B Streptococcus positive mother in pregnancy , day 1 of 2 of treatment.    AUC Model Data:  Loading dose: 2500 mg at 07:00 11/11/2024.  Regimen: 1250 mg IV every 8 hours.  Start time: 15:00 on 11/11/2024  Exposure target: AUC24 (range) 400-600 mg/L.hr   AUC24,ss: 522 mg/L.hr  Probability of AUC24 > 400: 77 %  Ctrough,ss: 16 mg/L  Probability of Ctrough,ss > 20: 31 %  Probability of nephrotoxicity (Lodise ALFONSO 2009): 11 %    Objective:  Ht: 167.6 cm (66\"); Wt: 120 kg (263 lb 12.8 oz)  Estimated Creatinine Clearance: 266.2 mL/min (A) (by C-G formula based on SCr of 0.43 mg/dL (L)).   Creatinine   Date Value Ref Range Status   11/06/2024 0.43 (L) 0.57 - 1.00 mg/dL Final   03/30/2024 0.51 (L) 0.57 - 1.00 mg/dL Final   02/09/2024 0.68 0.57 - 1.00 mg/dL Final   10/17/2023 0.71 0.57 - 1.00 mg/dL Final   06/08/2023 0.45 (L) 0.57 - 1.00 mg/dL Final   10/22/2019 0.5 0.5 - 0.9 mg/dL Final      Lab Results   Component Value Date    WBC 9.68 11/10/2024    WBC 8.12 11/06/2024    WBC 11.88 (H) 04/16/2024      Baseline culture results:  Microbiology Results (last 10 days)       Procedure Component Value - Date/Time    Chlamydia trachomatis, Neisseria gonorrhoeae, PCR w/ confirmation - Swab, Cervix [[id number]] Collected: 11/04/24 0850    Lab Status: Final result Specimen: Swab from Cervix Updated: 11/08/24 1008     Chlamydia " trachomatis, HIRAL Negative     Neisseria gonorrhoeae, HIRAL Negative    Narrative:      Performed at:   [facility] [address]  [id number]  : Stone Estes MD, Phone:  [phone]  Patient Fasting:  N     Strep B Screen - Swab, Vaginal/Rectum [[id number]]  (Abnormal) Collected: 24 0850    Lab Status: Final result Specimen: Swab from Vaginal/Rectum Updated: 24 1008     Strep Gp B HIRAL Positive     Comment: Centers for Disease Control and Prevention (CDC) and American Congress  of Obstetricians and Gynecologists (ACOG) guidelines for prevention of   group B streptococcal (GBS) disease specify co-collection of  a vaginal and rectal swab specimen to maximize sensitivity of GBS  detection. Per the CDC and ACOG, swabbing both the lower vagina and  rectum substantially increases the yield of detection compared with  sampling the vagina alone.  Penicillin G, ampicillin, or cefazolin are indicated for intrapartum  prophylaxis of  GBS colonization. Reflex susceptibility  testing should be performed prior to use of clindamycin only on GBS  isolates from penicillin-allergic women who are considered a high risk  for anaphylaxis. Treatment with vancomycin without additional testing  is warranted if resistance to clindamycin is noted.         Narrative:      Performed at:   - [facility] [address]  [id number]  : Bartolo Salazar PhD, Phone:  [phone]  Patient Fasting:  N             Lyudmila Trejo, PharmD  11/10/24 20:09 CST

## 2024-11-11 NOTE — L&D DELIVERY NOTE
[facility]   Vaginal Delivery Note    Patient Name: Mara Moreno  : 2000  MRN: [id number]    Date of Delivery: 2024    Diagnosis     Pre & Post-Delivery:  Intrauterine pregnancy at 37w2d  Labor status: Induced Onset of Labor    Gestational diabetes     (normal spontaneous vaginal delivery)             Problem List    Transfer to Postpartum     Review the Delivery Report for details.     Delivery     Delivery: Vaginal, Spontaneous    YOB: 2024   Time of Birth:  Gestational Age 2:47 PM  37w2d     Anesthesia: Epidural    Delivering clinician: Faizan Katz   Forceps?   No   Vacuum? No    Shoulder dystocia present: No        Delivery narrative:    The patient was noted to be completely dilated and effaced with the fetal head at the introitus. The fetal vertex was delivered cephalic spontaneously with maternal pushing efforts. No nuchal cord was identified . The left anterior shoulder was delivered atraumatically by maternal expulsive efforts with the assistance of routine downward traction. Bilateral compound presentation noted. The posterior shoulder delivered with maternal expulsive efforts and routine upward traction. The remainder of the fetus delivered spontaneously. Upon delivery, the infant was noted to be stunned and was passed to the mother's abdomen into the care of the awaiting Infant care team. Following a 30-second delay in cord clamping, the cord was clamped x2 and cut. Cord blood was obtained for analysis and blood gas analysis. NICU called by Infant team for assistance. During the third stage of labor, IV Pitocin was administered to enhance uterine contraction. The placenta delivered spontaneously & intact.  The cervix, vagina and perineum were inspected for lacerations. Bilateral superficial hemostatic periurethrals and a 1st degree perineal laceration were appreciated. The 1st degree was repaired with a 3-O vicryl. Hemostasis was confirmed. Cervix and vulva  "without lacerations. The uterine fundus was firm at the end of the procedure. Anesthesia: epidural. Mom and baby tolerated the delivery well. All needle, sponge, and instrument counts were noted to be correct x2 at the end of the procedure.         Infant     Findings: female infant     Infant observations: Weight: 2670 g (5 lb 14.2 oz)  Length: 19 in  Observations/Comments:  HC:30.5cm aga     Apgars: 4  @ 1 minute /    9  @ 5 minutes         Placenta & Cord         Placenta delivered    at        Cord: 3 vessels present.   Nuchal Cord?  no   Cord blood obtained: No   Cord gases obtained:  Yes   Cord gas results: Venous:  No results found for: \"PHCVEN\", \"BECVEN\"    Arterial:  No results found for: \"PHCART\", \"BECART\"     Repair     Episiotomy: None    No    Lacerations: Yes  Laceration Information  Laceration Repaired?   Perineal: 1st Yes   Periurethral: bilateral Yes   Labial:       Sulcus:       Vaginal:       Cervical:         Suture used for repair: 3-0 Vicryl  Laceration Length for 3rd or 4th degree lacerations: n/a   Estimated Blood Loss:  150 mL     Quantitative Blood Loss:          Complications     none    Disposition     Mother to Mother Baby/Postpartum  in stable condition currently.  Baby to remains with mom  in stable condition currently.    Faizan Katz MD  11/11/24  15:51 CST        "

## 2024-11-11 NOTE — ANESTHESIA PROCEDURE NOTES
Labor Epidural      Patient reassessed immediately prior to procedure    Patient location during procedure: OB  Performed By  CLARI/CAA: Louis Alvarez CRNA  Preanesthetic Checklist  Completed: patient identified, IV checked, site marked, risks and benefits discussed, surgical consent, monitors and equipment checked, pre-op evaluation and timeout performed  Prep:  Pt Position:sitting  Sterile Tech:cap, gloves, mask and sterile barrier  Prep:chlorhexidine gluconate and isopropyl alcohol  Monitoring:blood pressure monitoring and continuous pulse oximetry  Epidural Block Procedure:  Approach:midline  Guidance:palpation technique  Location:L3-L4  Needle Type:Tuohy  Needle Gauge:18 G  Loss of Resistance Medium: saline  Loss of Resistance: 7cm  Cath Depth at skin:15 cm  Paresthesia: none  Aspiration:negative  Test Dose:negative  Medication: lidocaine 1.5%-EPINEPHrine 1:200,000 (XYLOCAINE W/EPI) injection - Injection   3 mL - 11/11/2024 1:24:00 PM  Number of Attempts: 1  Post Assessment:  Dressing:secured with tape  Pt Tolerance:patient tolerated the procedure well with no apparent complications  Complications:no

## 2024-11-11 NOTE — PLAN OF CARE
Goal Outcome Evaluation:  Plan of Care Reviewed With: patient        Progress: improving  Outcome Evaluation: vss. voiding and passing flatus. up ad vish. u2 ff ml scant. controling pain with po med.

## 2024-11-11 NOTE — LACTATION NOTE
Mother's Name: Mara  Phone #: [phone]  Infant Name: Larry Alvarez : 24  Gestation: 37w2d  Day of life:  Birth weight: 5-14.2 (2670g) Discharge weight:  Weight Loss:   24 hour Summary of Feeds:  Voids:  Stools:  Assistive devices (shields, shells, etc):  Significant Maternal history: , Cardiac Arrest, Spontaneous V-Fib, Defibrillator, GDM, Anxiety, HTN, Depression, PCOS  Maternal Concerns:    Maternal Goal: Breastfeed exclusively-wants to use donor breastmilk if supplementation is needed.  Mother's Medications: PNV, Zoloft, Pepcid, Macrobid, Reglan, Zofran, Corgard (L4-limited data, possibly hazardous-it's secreted into breastmilk in moderately high concentrations-Alternatives are Propranolol, Metoprolol, Labetalol)  Breastpump for home: Spectra   Ped follow up appt:    Called to assist with feeding after delivery. Infant skin to skin with patient upon visit. Patient reports attempting on the right breast prior to lactation visit. States she is unsure how well infant did as she was sleepy for the most part. Assisted with positioning, hand expressing, and deep latching techniques. Able to easily express drops of colostrum. Infant latched well with wide gape, however, fell asleep quickly. Constant stimulation required. Hand expressed the entire feed of 15 minutes. Swallows noted. Infant remained skin to skin while lactation continued to hand express. 2 ml collected and syringe fed to infant. Initial breastfeeding education provided. Discussed milk supply, risks for hypoglycemia, interventions to prevent hypoglycemia, signs of nutritive feeds, breast compressions with feeds to increase transfer, waking techniques, common feeding difficulties due to GA, medication safety (listed above), and expected infant weight loss/output. Recommended frequent skin to skin and hand expressing feeding drops. Encouragement and support provided. Answered all questions. Further questions denied.     RN notified of L4  medication listed above    1830  Pump assembled at the bedside with 24 mm flanges. Medicine cups and syringes given. RN in room states infant's BG is currently 56. Much encouragement provided for this. Discussed possible need to supplement if BG drops below 45 through the night. Patient okay to supplement using donor breastmilk. Education provided regarding use of pump, cleaning of pump parts, and flange size. Recommended pumping for 15-20 minutes anytime infant is supplemented. Patient appreciative. Questions denied.     Instructed patient our lactation team is here for continued support throughout their breastfeeding journey. Our team has encouraged patient to call with any questions or concerns that may arise after discharge.

## 2024-11-11 NOTE — ANESTHESIA PREPROCEDURE EVALUATION
Anesthesia Evaluation     Nursing notes reviewed   history of anesthetic complications:  PONV  NPO Solid Status: > 8 hours  NPO Liquid Status: < 2 hours           Airway   Mallampati: I  TM distance: >3 FB  Neck ROM: full  No difficulty expected  Dental - normal exam     Pulmonary - negative pulmonary ROS   Cardiovascular   Exercise tolerance: good (4-7 METS)    ECG reviewed  Rhythm: regular  Rate: normal    (+) pacemaker ICD, hypertension    ROS comment: Sees cardiologist q6 months. Last seen 2 months ago during pregnancy. No issues. ICD has never gone off. Placed for unknown etiology cardiac arrest.    Neuro/Psych  GI/Hepatic/Renal/Endo    (+) diabetes mellitus gestational    Musculoskeletal (-) negative ROS    Abdominal    Substance History - negative use     OB/GYN    (+) Pregnant        Other - negative ROS                   Anesthesia Plan    ASA 3     epidural       Anesthetic plan, risks, benefits, and alternatives have been provided, discussed and informed consent has been obtained with: patient.    CODE STATUS:    Code Status (Patient has no pulse and is not breathing): CPR (Attempt to Resuscitate)  Medical Interventions (Patient has pulse or is breathing): Full Support

## 2024-11-11 NOTE — PLAN OF CARE
Problem: Adult Inpatient Plan of Care  Goal: Patient-Specific Goal (Individualized)  Outcome: Progressing  Flowsheets (Taken 11/11/2024 0442)  Patient/Family-Specific Goals (Include Timeframe): successful vaginal delivery today  Individualized Care Needs: explain POC and answer questions  Anxieties, Fears or Concerns: anxious about labor process   Goal Outcome Evaluation:  Plan of Care Reviewed With: patient, spouse, parent        Progress: improving  Outcome Evaluation: IOL for IUGR and abnormal dopplers; cytotec 25mcg x3 doses given overnight; patient reporting some mild cramping; some irregular ctx and irritability seen on toco; regular diet overnight; SVE 1-2/40/-3; hx of cardiac arrest in 2021-defibrillator in place; baby girl, breastfeeding

## 2024-11-12 LAB
HCT VFR BLD AUTO: 33.2 % (ref 34–46.6)
HGB BLD-MCNC: 10.7 G/DL (ref 12–15.9)

## 2024-11-12 PROCEDURE — 0503F POSTPARTUM CARE VISIT: CPT | Performed by: ADVANCED PRACTICE MIDWIFE

## 2024-11-12 PROCEDURE — 85014 HEMATOCRIT: CPT | Performed by: OBSTETRICS & GYNECOLOGY

## 2024-11-12 PROCEDURE — 85018 HEMOGLOBIN: CPT | Performed by: OBSTETRICS & GYNECOLOGY

## 2024-11-12 RX ADMIN — DOCUSATE SODIUM 100 MG: 100 CAPSULE, LIQUID FILLED ORAL at 21:52

## 2024-11-12 RX ADMIN — ACETAMINOPHEN 650 MG: 325 TABLET ORAL at 19:56

## 2024-11-12 RX ADMIN — IBUPROFEN 600 MG: 600 TABLET, FILM COATED ORAL at 10:35

## 2024-11-12 RX ADMIN — PRENATAL VIT W/ FE FUMARATE-FA TAB 27-0.8 MG 1 TABLET: 27-0.8 TAB at 21:52

## 2024-11-12 RX ADMIN — DOCUSATE SODIUM 100 MG: 100 CAPSULE, LIQUID FILLED ORAL at 10:35

## 2024-11-12 RX ADMIN — IBUPROFEN 600 MG: 600 TABLET, FILM COATED ORAL at 16:57

## 2024-11-12 RX ADMIN — SERTRALINE HYDROCHLORIDE 100 MG: 50 TABLET ORAL at 10:35

## 2024-11-12 RX ADMIN — ACETAMINOPHEN 650 MG: 325 TABLET ORAL at 13:55

## 2024-11-12 RX ADMIN — METOPROLOL TARTRATE 25 MG: 25 TABLET, FILM COATED ORAL at 21:52

## 2024-11-12 RX ADMIN — ACETAMINOPHEN 650 MG: 325 TABLET ORAL at 06:53

## 2024-11-12 RX ADMIN — Medication: at 17:18

## 2024-11-12 RX ADMIN — ACETAMINOPHEN 650 MG: 325 TABLET ORAL at 03:05

## 2024-11-12 RX ADMIN — IBUPROFEN 600 MG: 600 TABLET, FILM COATED ORAL at 21:52

## 2024-11-12 RX ADMIN — Medication 10 ML: at 10:39

## 2024-11-12 RX ADMIN — IBUPROFEN 600 MG: 600 TABLET, FILM COATED ORAL at 05:19

## 2024-11-12 NOTE — PLAN OF CARE
Goal Outcome Evaluation:  Plan of Care Reviewed With: patient        Progress: improving  Outcome Evaluation: VSS, FFML U1, scant rubra, scant rubra, ambulating, voiding, ERAS for pain, bonding with

## 2024-11-12 NOTE — PLAN OF CARE
Goal Outcome Evaluation:  Plan of Care Reviewed With: patient, spouse        Progress: improving  Outcome Evaluation: VSS. FF,ML, U1, scant. Ambulating and voiding. ERAS for pain. BM x2 today. Breastfeeding and pumping well. Bonding well with infant.

## 2024-11-12 NOTE — LACTATION NOTE
"Mother's Name: Mara  Phone #: [phone]  Infant Name: Larry Alvarez : 24  Gestation: 37w2d  Day of life:  Birth weight: 5-14.2 (2670g) Discharge weight:  Weight Loss:   24 hour Summary of Feeds:  Voids:  Stools:  Assistive devices (shields, shells, etc):  Significant Maternal history: , Cardiac Arrest, Spontaneous V-Fib, Defibrillator, GDM, Anxiety, HTN, Depression, PCOS  Maternal Concerns:    Maternal Goal: Breastfeed exclusively-wants to use donor breastmilk if supplementation is needed.  Mother's Medications: PNV, Zoloft, Pepcid, Macrobid, Reglan, Zofran, Corgard (L4-limited data, possibly hazardous-it's secreted into breastmilk in moderately high concentrations-Alternatives are Propranolol, Metoprolol, Labetalol)  Breastpump for home: Spectra   Ped follow up appt: ELLIOT Parrish    Called to room to assist with feeding. Patient reported much difficulty through the night. She stated, \"I wonder if I can do this once home?\" Assisted with positioning, hand expressing, and deep latching. Infant continued to latch to nipple or tongue thrust, pushing nipple out. After several attempts in different positions, infant latched deeply with wide gape and flanged lips. Patient able to identify when infant isn't latched deep enough, however, denied pain with latch. Infant  well though difficult to attain deep latch. Multiple deep jaw drops with audible swallows noted. Much encouragement provided as infant's weight loss is minimal, BG checks have all been WNL, output is adequate, and she is hand expressing well. Recommended patient pump for about 10 minutes after feedings today and feed infant all collected. Discussed possibly utilizing a nipple shield to aid with latch if difficulties continue. Ongoing support offered throughout the day. Patient appreciative.     1240  Called to room to assist. Verbal and hands on assistance provided, however, latching continues to be quite difficult. Able " to attain latch after several attempts. Initiated pumping at this time. Observed milk collecting in bottles before leaving. Offered assistance with next feeding possibly using nipple shield as previously discussed.     1620  Called to room to assist. Patient reports attempting to latch infant for 20 minutes. With permission, attempted latch using 20 and 24 mm nipple shield in hopes it may aid with deep latching. This was unsuccessful as the 20 was too small for patient and the 24 was too large for infant. Lactation staff continued to have much difficulty with latching and keeping infant latched deeply. Multiple attempts made in different positions over the course of 45 minutes. Hand expressed drops throughout feeding attempt. FOB now to hold infant skin to skin. Assisted patient with breast massage and pumping. 2.6 ml collected and syringe fed per FOB. Recommended discontinuing feeding attempts after 20-30 minutes if latching is unsuccessful, pump for 15-20 minutes, feed all collected, and supplement requesting donor breastmilk if needed. Discussed feeding amounts (handout given as well) and outpatient lactation support. Encouragement and support provided. Patient appreciative.    Instructed patient our lactation team is here for continued support throughout their breastfeeding journey. Our team has encouraged patient to call with any questions or concerns that may arise after discharge.

## 2024-11-12 NOTE — ANESTHESIA POSTPROCEDURE EVALUATION
Patient: Mara Moreno    Procedure Summary       Date: 11/11/24 Room / Location:     Anesthesia Start: 1310 Anesthesia Stop: 1447    Procedure: LABOR ANALGESIA Diagnosis:     Scheduled Providers:  Provider: Louis Alvarez CRNA    Anesthesia Type: epidural ASA Status: 3            Anesthesia Type: epidural    Vitals  Vitals Value Taken Time   /73 11/12/24 0801   Temp 98.3 °F (36.8 °C) 11/12/24 0801   Pulse 93 11/12/24 0801   Resp 16 11/12/24 0801   SpO2 98 % 11/12/24 0801           Post Anesthesia Care and Evaluation    Patient location during evaluation: PHASE II  Patient participation: complete - patient participated  Level of consciousness: awake and alert  Pain score: 0    Airway patency: patent  Anesthetic complications: No anesthetic complications  PONV Status: none  Cardiovascular status: acceptable  Respiratory status: acceptable  Hydration status: acceptable

## 2024-11-12 NOTE — PROGRESS NOTES
"      ELLIOT Taylor  [facility]  [address]  [place] [address]  Office [phone]  Fax [phone]    [facility]  Vaginal Delivery Progress Note    Subjective   Postpartum Day 1: Vaginal Delivery    Patient is feeling well.  Pain is managed with scheduled Tylenol and Motrin. Bleeding much like her menses.  Has had 1 clot but not very large.  Breast-feeding, but infant keeps falling asleep at the breast.  She is able to hand express.  Denies concerns.  Voiding and ambulating without difficulty.      Objective     Vital Signs Range for the last 24 hours  Temperature: Temp:  [97.7 °F (36.5 °C)-98.9 °F (37.2 °C)] 97.7 °F (36.5 °C)   Temp Source: Temp src: Temporal   BP: BP: (106-157)/() 134/76   Pulse: Heart Rate:  [] 70   Respirations: Resp:  [16-18] 16   SPO2: SpO2:  [97 %-100 %] 100 %   O2 Amount (l/min):     O2 Devices Device (Oxygen Therapy): room air   Weight: Weight:  [119 kg (263 lb)] 119 kg (263 lb)     Admit Height:  Height: 167.6 cm (66\")      Physical Exam:  General:  no acute distresss.  Abdomen: Soft, without significant tenderness.  Fundus firm.  Extremities: Movement without difficulty.  Calves nontender.  No edema.    Lab results reviewed: Yes, awaiting postpartum day 1 labs.  Rubella:  No results found for: \"RUBELLAIGGIN\" Nurse Transcribed from prenatal record --  No components found for: \"EXTRUBELQUAL\"  Rh Status:    RH type   Date Value Ref Range Status   11/10/2024 Positive  Final     Immunizations:   Immunization History   Administered Date(s) Administered    ABRYSVO (RSV, 60+ or pregnant women 32-36 wks) 10/15/2024    COVID-19 (MODERNA) 1st,2nd,3rd Dose Monovalent 01/08/2021, 02/05/2021    Fluzone  >6mos 10/01/2024    Fluzone (or Fluarix & Flulaval for VFC) >6mos 11/09/2022, 10/31/2023    Hepatitis B Adult/Adolescent IM 08/06/2019, 01/29/2020    Influenza Injectable Mdck Pf Quad 10/12/2018    Influenza, Unspecified 09/19/2020    Tdap 05/07/2018, 09/03/2024 "     Lab Results (last 24 hours)       Procedure Component Value Units Date/Time    POC Glucose Once [[id number]]  (Normal) Collected: 11/11/24 1246    Specimen: Blood Updated: 11/11/24 1257     Glucose 103 mg/dL      Comment: : [id number] Franciscoreportbrainолег MejíaFusionStormMeter ID: [id number]       Treponema pallidum AB w/Reflex RPR [[id number]]  (Normal) Collected: 11/2000    Specimen: Blood Updated: 11/11/24 1157     Treponemal AB Total Non-Reactive    Narrative:      Reactive results will reflex RPR testing.    POC Glucose Once [[id number]]  (Normal) Collected: 11/11/24 1029    Specimen: Blood Updated: 11/11/24 1041     Glucose 83 mg/dL      Comment: : [id number] BioSTLолег Merchant AmericaMeter ID: [id number]               External Prenatal Results       Pregnancy Outside Results - Transcribed From Office Records - See Scanned Records For Details       Test Value Date Time    ABO  A  11/10/24 1915    Rh  Positive  11/10/24 1915    Antibody Screen  Negative  11/10/24 1915       Negative  11/06/24 1700       Negative  04/16/24 1414    Varicella IgG  474 index 04/16/24 1414    Rubella  3.11 index 04/16/24 1414    Hgb  10.3 g/dL 11/10/24 1915       11.0 g/dL 11/06/24 1700       10.9 g/dL 09/03/24 0829       12.5 g/dL 04/16/24 1414       12.5 g/dL 03/30/24 1502    Hct  31.5 % 11/10/24 1915       33.1 % 11/06/24 1700       38.6 % 04/16/24 1414       37.9 % 03/30/24 1502    HgB A1c        1h GTT  261 mg/dL 09/03/24 0829    3h GTT Fasting       3h GTT 1 hour       3h GTT 2 hour       3h GTT 3 hour        Gonorrhea (discrete)  Negative  11/04/24 0850       Negative  08/20/24 0814       Negative  04/16/24 1414    Chlamydia (discrete)  Negative  11/04/24 0850       Negative  08/20/24 0814       Negative  04/16/24 1414    RPR  Non Reactive  09/03/24 0829       Non Reactive  04/16/24 1414    Syphils cascade: TP-Ab (FTA)  Non-Reactive  11/2000       Non Reactive  08/20/24 0810    TP-Ab  Non-Reactive  11/2000       Non Reactive   24 0810    TP-Ab (EIA)       TPPA       HBsAg  Negative  24 0829       Negative  24 0810       Negative  24 1414    Herpes Simplex Virus PCR       Herpes Simplex VIrus Culture       HIV  Non Reactive  24 0810       Non Reactive  24 1414    Hep C RNA Quant PCR       Hep C Antibody  Non Reactive  24 1414    AFP       NIPT       Cystic Fibrosis (Vidhya)  Negative  24 1133    Cystic Fibroisis        Spinal Muscular atrophy  Negative  24 1133    Fragile X       Group B Strep  Positive  24 0850    GBS Susceptibility to Clindamycin       GBS Susceptibility to Erythromycin       Fetal Fibronectin       Genetic Testing, Maternal Blood                 Drug Screening       Test Value Date Time    Urine Drug Screen       Amphetamine Screen       Barbiturate Screen       Benzodiazepine Screen       Methadone Screen       Phencyclidine Screen       Opiates Screen       THC Screen       Cocaine Screen       Propoxyphene Screen       Buprenorphine Screen       Methamphetamine Screen       Oxycodone Screen       Tricyclic Antidepressants Screen                 Legend    ^: Historical                            Assessment & Plan       Gestational diabetes     (normal spontaneous vaginal delivery)      Mara Cagered is Day 1 post-partum  Vaginal, Spontaneous  .      Plan: Continue with postpartum plan of care.  Corgard labeled as an L4, so patient changed to metoprolol, as she has taken this before.  Lactation support for breast-feeding.      This note has been signed electronically.    Kaelyn Rogers, DNP, APRN, CNM, RNC-OB  2024  07:33 CST

## 2024-11-13 ENCOUNTER — APPOINTMENT (OUTPATIENT)
Dept: LACTATION | Facility: HOSPITAL | Age: 24
End: 2024-11-13
Payer: COMMERCIAL

## 2024-11-13 VITALS
RESPIRATION RATE: 16 BRPM | OXYGEN SATURATION: 98 % | HEIGHT: 66 IN | BODY MASS INDEX: 42.27 KG/M2 | HEART RATE: 98 BPM | TEMPERATURE: 98.1 F | DIASTOLIC BLOOD PRESSURE: 78 MMHG | WEIGHT: 263 LBS | SYSTOLIC BLOOD PRESSURE: 139 MMHG

## 2024-11-13 PROCEDURE — 0503F POSTPARTUM CARE VISIT: CPT | Performed by: ADVANCED PRACTICE MIDWIFE

## 2024-11-13 RX ORDER — METOPROLOL TARTRATE 25 MG/1
25 TABLET, FILM COATED ORAL NIGHTLY
Qty: 30 TABLET | Refills: 1 | Status: SHIPPED | OUTPATIENT
Start: 2024-11-13

## 2024-11-13 RX ORDER — ACETAMINOPHEN 325 MG/1
650 TABLET ORAL EVERY 4 HOURS PRN
Start: 2024-11-13

## 2024-11-13 RX ORDER — IBUPROFEN 200 MG
600 TABLET ORAL EVERY 4 HOURS PRN
Start: 2024-11-13

## 2024-11-13 RX ADMIN — IBUPROFEN 600 MG: 600 TABLET, FILM COATED ORAL at 10:29

## 2024-11-13 RX ADMIN — SERTRALINE HYDROCHLORIDE 100 MG: 50 TABLET ORAL at 08:50

## 2024-11-13 RX ADMIN — IBUPROFEN 600 MG: 600 TABLET, FILM COATED ORAL at 05:21

## 2024-11-13 RX ADMIN — ACETAMINOPHEN 650 MG: 325 TABLET ORAL at 07:28

## 2024-11-13 RX ADMIN — DOCUSATE SODIUM 100 MG: 100 CAPSULE, LIQUID FILLED ORAL at 08:50

## 2024-11-13 RX ADMIN — ACETAMINOPHEN 650 MG: 325 TABLET ORAL at 02:35

## 2024-11-13 NOTE — DISCHARGE INSTR - APPOINTMENTS
Appointment with Kaelyn Rogers on November 26th at 11:30 am     Appointment with  on December 19th at 10:30 am

## 2024-11-13 NOTE — PROGRESS NOTES
"      ELLIOT Taylor  [facility]  [address]  [place] [address]  Office [phone]  Fax [phone]    [facility]  Vaginal Delivery Progress Note    Subjective   Postpartum Day 2: Vaginal Delivery    The patient feels well.  Her pain is well controlled with Tylenol, Motrin, and comfort products.   She is ambulating well.  Patient describes her bleeding as thin lochia.    Breastfeeding: latching with difficulty secondary to tongue tie.    Objective     Vital Signs Range for the last 24 hours  Temperature: Temp:  [98.3 °F (36.8 °C)-98.5 °F (36.9 °C)] 98.5 °F (36.9 °C)   Temp Source: Temp src: Temporal   BP: BP: (131-137)/(73-75) 137/74   Pulse: Heart Rate:  [91-98] 91   Respirations: Resp:  [16-18] 18   SPO2: SpO2:  [98 %-100 %] 100 %   O2 Amount (l/min):     O2 Devices Device (Oxygen Therapy): room air   Weight:       Admit Height:  Height: 167.6 cm (66\")      Physical Exam:  General:  no acute distresss.  Abdomen: abdomen is soft without significant tenderness, masses, organomegaly or guarding. Fundus: appropriate, firm, non tender  Extremities: normal, atraumatic, no cyanosis, and trace edema.       Lab results reviewed:  Yes   Rubella:  No results found for: \"RUBELLAIGGIN\" Nurse Transcribed from prenatal record --  No components found for: \"EXTRUBELQUAL\"  Rh Status:    RH type   Date Value Ref Range Status   11/10/2024 Positive  Final     Immunizations:   Immunization History   Administered Date(s) Administered    ABRYSVO (RSV, 60+ or pregnant women 32-36 wks) 10/15/2024    COVID-19 (MODERNA) 1st,2nd,3rd Dose Monovalent 01/08/2021, 02/05/2021    Fluzone  >6mos 10/01/2024    Fluzone (or Fluarix & Flulaval for VFC) >6mos 11/09/2022, 10/31/2023    Hepatitis B Adult/Adolescent IM 08/06/2019, 01/29/2020    Influenza Injectable Mdck Pf Quad 10/12/2018    Influenza, Unspecified 09/19/2020    Tdap 05/07/2018, 09/03/2024     Lab Results (last 24 hours)       Procedure Component Value Units " Date/Time    Hemoglobin & Hematocrit, Blood [[id number]]  (Abnormal) Collected: 11/12/24 1304    Specimen: Blood Updated: 11/12/24 1312     Hemoglobin 10.7 g/dL      Hematocrit 33.2 %     Tissue Pathology Exam [[id number]] Collected: 11/11/24 1506    Specimen: Tissue from Placenta Updated: 11/12/24 0903            External Prenatal Results       Pregnancy Outside Results - Transcribed From Office Records - See Scanned Records For Details       Test Value Date Time    ABO  A  11/10/24 1915    Rh  Positive  11/10/24 1915    Antibody Screen  Negative  11/10/24 1915       Negative  11/06/24 1700       Negative  04/16/24 1414    Varicella IgG  474 index 04/16/24 1414    Rubella  3.11 index 04/16/24 1414    Hgb  10.7 g/dL 11/12/24 1304       10.3 g/dL 11/10/24 1915       11.0 g/dL 11/06/24 1700       10.9 g/dL 09/03/24 0829       12.5 g/dL 04/16/24 1414       12.5 g/dL 03/30/24 1502    Hct  33.2 % 11/12/24 1304       31.5 % 11/10/24 1915       33.1 % 11/06/24 1700       38.6 % 04/16/24 1414       37.9 % 03/30/24 1502    HgB A1c        1h GTT  261 mg/dL 09/03/24 0829    3h GTT Fasting       3h GTT 1 hour       3h GTT 2 hour       3h GTT 3 hour        Gonorrhea (discrete)  Negative  11/04/24 0850       Negative  08/20/24 0814       Negative  04/16/24 1414    Chlamydia (discrete)  Negative  11/04/24 0850       Negative  08/20/24 0814       Negative  04/16/24 1414    RPR  Non Reactive  09/03/24 0829       Non Reactive  04/16/24 1414    Syphils cascade: TP-Ab (FTA)  Non-Reactive  11/2000       Non Reactive  08/20/24 0810    TP-Ab  Non-Reactive  11/2000       Non Reactive  08/20/24 0810    TP-Ab (EIA)       TPPA       HBsAg  Negative  09/03/24 0829       Negative  08/20/24 0810       Negative  04/16/24 1414    Herpes Simplex Virus PCR       Herpes Simplex VIrus Culture       HIV  Non Reactive  08/20/24 0810       Non Reactive  04/16/24 1414    Hep C RNA Quant PCR       Hep C Antibody  Non Reactive  04/16/24 1417     AFP       NIPT       Cystic Fibrosis (Vidhya)  Negative  24 1133    Cystic Fibroisis        Spinal Muscular atrophy  Negative  24 1133    Fragile X       Group B Strep  Positive  24 0850    GBS Susceptibility to Clindamycin       GBS Susceptibility to Erythromycin       Fetal Fibronectin       Genetic Testing, Maternal Blood                 Drug Screening       Test Value Date Time    Urine Drug Screen       Amphetamine Screen       Barbiturate Screen       Benzodiazepine Screen       Methadone Screen       Phencyclidine Screen       Opiates Screen       THC Screen       Cocaine Screen       Propoxyphene Screen       Buprenorphine Screen       Methamphetamine Screen       Oxycodone Screen       Tricyclic Antidepressants Screen                 Legend    ^: Historical                            Assessment & Plan       Gestational diabetes     (normal spontaneous vaginal delivery)      Mara Moreno is Day 2  post-partum  Vaginal, Spontaneous  .      Plan:  Continue with postpartum plan of care. Lactation support as needed. She is to also follow-up with her cardiologist secondary to medication change from Corgard to metoprolol. Discharge instructions provided with questions answered and understanding verbalized. Return to the clinic in 2 weeks for postpartum mood check.       Plan for discharge reviewed with Dr. Irving.     This note has been signed electronically.    Kaelyn Rogers DNP, APRN, CNM, RNC-OB  2024  07:44 CST

## 2024-11-13 NOTE — LACTATION NOTE
This note was copied from a baby's chart.  Mother's Name: Mara              Phone #: [phone]  Infant Name: Larry Alvarez       : 24  Gestation: 37w2d  Day of life:  Birth weight: 5-14.2 (2670g)Discharge weight:  Weight Loss:   24 hour Summary of Feeds:               Voids:              Stools:  Assistive devices (shields, shells, etc):  Significant Maternal history: , Cardiac Arrest, Spontaneous V-Fib, Defibrillator, GDM, Anxiety, HTN, Depression, PCOS  Maternal Concerns:      Maternal Goal: Breastfeed exclusively-wants to use donor breastmilk if supplementation is needed.  Mother's Medications: PNV, Zoloft, Pepcid, Macrobid, Reglan, Zofran, Corgard (L4-limited data, possibly hazardous-it's secreted into breastmilk in moderately high concentrations-Alternatives are Propranolol, Metoprolol, Labetalol)  Breastpump for home: Spectra   Ped follow up appt: ELLIOT Parrish    F/U with mom per her request.  After waking infant nursing and MD came in to assess her.  Infant awake when placed at breast, but quickly fell asleep.  Attempted first to latch without shield.  Infant noted to tongue suck and was difficult to ellicit a wide gaping mouth.  After attempting about 10 minutes infant fell asleep.  While attempting to wake infant with infant sit ups, rubbing back and arms infant spit up thick white milk.  Bulb syringe used to clear mouth and nose.  Infant held breath during episode, and slight color change was noted until she began crying.  Crying continued without further color change.  Infant began rooting again and 20 mm shield was applied.  Some EBM was in shield.  She was able to latch deeply on NS and began sucking with deep jaw drops and occasional swallow heard.  She remained active for about 10 minutes then fell asleep.  Educated and demonstrated breast compression while infant was latched.  With mom's permission hand expressed 0.4 ml of colostrum from left breast and this was placed  in infant's mouth with a syringe, while latched, to encourage sucking.  Mom encouraged to pump at this time and feed infant via syringe 5-10 ml.  If feeding over 10 ml mom to use paced bottle feeding.  Information given on this.  Mom is to see lactation tomorrow and was educated on continuing to pump and PROTECT HER MILK SUPPLY, while waiting for infant to improve on latching at the breast.  She can attempt to latch every feed, but no longer than 15-20 minutes if infant isn't interested or unable to latch.  She has handout on normal breastmilk feeding amounts based on infant age and will attempt to increase feedings today to 5-15 ml per feeding.

## 2024-11-13 NOTE — DISCHARGE SUMMARY
[facility]    Kaelyn Tain, APRN  [address]  [place] [address]  [phone]      Discharge Summary     [place]  Mara Moreno  : 2000  MRN: [id number]  CSN: [id number]    Date of Admission: 11/10/2024   Date of Discharge:  2024   Delivering Physician: Faizan Katz       Admission Diagnosis: Gestational diabetes [O24.419]   Discharge Diagnosis: Pregnancy at 37w2d - delivered       Procedures: 2024 - Vaginal, Spontaneous      Hospital Course  Patient is a 24 y.o.  who at 37w2d had a vaginal birth.  Her postpartum course was without complications.  On PPD #2 she was ready for discharge.  She had normal lochia and pain well controlled with oral medications.    Infant  female fetus weighing 2670 g (5 lb 14.2 oz)  Apgars -  4 @ 1 minute /  9 @ 5 minutes.    Discharge labs  Lab Results   Component Value Date    WBC 9.68 11/10/2024    HGB 10.7 (L) 2024    HCT 33.2 (L) 2024     11/10/2024       Discharge Medications     Discharge Medications        New Medications        Instructions Start Date   acetaminophen 325 MG tablet  Commonly known as: TYLENOL   650 mg, Oral, Every 4 Hours PRN      ibuprofen 200 MG tablet  Commonly known as: ADVIL,MOTRIN   600 mg, Oral, Every 4 Hours PRN      metoprolol tartrate 25 MG tablet  Commonly known as: LOPRESSOR   25 mg, Oral, Nightly             Continue These Medications        Instructions Start Date   Breast Pump misc   1 Device, Not Applicable, As Needed      Contour Next EZ w/Device kit   1 each, Not Applicable, Take As Directed      famotidine 20 MG tablet  Commonly known as: Pepcid   20 mg, Oral, Daily      glucose blood test strip   Test blood sugar fasting and 1-hour after each meal (breakfast, lunch, and supper).      Contour Next Test test strip  Generic drug: glucose blood   Use as instructed      metoclopramide 10 MG tablet  Commonly known as: Reglan   10 mg, Oral, Every 6 Hours PRN       Microlet Lancets misc   Test fasting and then 1 hour after each meal, total of four times a day      ondansetron ODT 4 MG disintegrating tablet  Commonly known as: ZOFRAN-ODT   4 mg, Translingual, Every 8 Hours PRN      ondansetron ODT 4 MG disintegrating tablet  Commonly known as: ZOFRAN-ODT   8 mg, Translingual, Every 8 Hours PRN      prenatal vitamin 27-0.8 27-0.8 MG tablet tablet   1 tablet, Oral, Daily      promethazine 12.5 MG tablet  Commonly known as: PHENERGAN   12.5 mg, Oral, Every 6 Hours PRN      sertraline 100 MG tablet  Commonly known as: ZOLOFT   100 mg, Oral, Daily             Stop These Medications      nadolol 20 MG tablet  Commonly known as: CORGARD     nitrofurantoin (macrocrystal-monohydrate) 100 MG capsule  Commonly known as: Macrobid              External Prenatal Results       Pregnancy Outside Results - Transcribed From Office Records - See Scanned Records For Details       Test Value Date Time    ABO  A  11/10/24 1915    Rh  Positive  11/10/24 1915    Antibody Screen  Negative  11/10/24 1915       Negative  11/06/24 1700       Negative  04/16/24 1414    Varicella IgG  474 index 04/16/24 1414    Rubella  3.11 index 04/16/24 1414    Hgb  10.7 g/dL 11/12/24 1304       10.3 g/dL 11/10/24 1915       11.0 g/dL 11/06/24 1700       10.9 g/dL 09/03/24 0829       12.5 g/dL 04/16/24 1414       12.5 g/dL 03/30/24 1502    Hct  33.2 % 11/12/24 1304       31.5 % 11/10/24 1915       33.1 % 11/06/24 1700       38.6 % 04/16/24 1414       37.9 % 03/30/24 1502    HgB A1c        1h GTT  261 mg/dL 09/03/24 0829    3h GTT Fasting       3h GTT 1 hour       3h GTT 2 hour       3h GTT 3 hour        Gonorrhea (discrete)  Negative  11/04/24 0850       Negative  08/20/24 0814       Negative  04/16/24 1414    Chlamydia (discrete)  Negative  11/04/24 0850       Negative  08/20/24 0814       Negative  04/16/24 1414    RPR  Non Reactive  09/03/24 0829       Non Reactive  04/16/24 1414    Syphils cascade: TP-Ab (FTA)   Non-Reactive  11/2000       Non Reactive  08/20/24 0810    TP-Ab  Non-Reactive  11/2000       Non Reactive  08/20/24 0810    TP-Ab (EIA)       TPPA       HBsAg  Negative  09/03/24 0829       Negative  08/20/24 0810       Negative  04/16/24 1414    Herpes Simplex Virus PCR       Herpes Simplex VIrus Culture       HIV  Non Reactive  08/20/24 0810       Non Reactive  04/16/24 1414    Hep C RNA Quant PCR       Hep C Antibody  Non Reactive  04/16/24 1414    AFP       NIPT       Cystic Fibrosis (Vidhya)  Negative  05/17/24 1133    Cystic Fibroisis        Spinal Muscular atrophy  Negative  05/17/24 1133    Fragile X       Group B Strep  Positive  11/04/24 0850    GBS Susceptibility to Clindamycin       GBS Susceptibility to Erythromycin       Fetal Fibronectin       Genetic Testing, Maternal Blood                 Drug Screening       Test Value Date Time    Urine Drug Screen       Amphetamine Screen       Barbiturate Screen       Benzodiazepine Screen       Methadone Screen       Phencyclidine Screen       Opiates Screen       THC Screen       Cocaine Screen       Propoxyphene Screen       Buprenorphine Screen       Methamphetamine Screen       Oxycodone Screen       Tricyclic Antidepressants Screen                 Legend    ^: Historical                            Discharge Disposition Home or Self Care   Condition on Discharge: good   Follow-up: 2 weeks with Dr. Katz or JILL Rogers for postpartum mood check       Plan for discharge reviewed with Dr. Irving.    This note has been signed electronically.    Kaelyn Rogers, DNP, APRN, JILL, RNC-OB  11/13/2024

## 2024-11-13 NOTE — PLAN OF CARE
Goal Outcome Evaluation:  Plan of Care Reviewed With: patient        Progress: improving  Outcome Evaluation: VSS, FFML U1, scant rubra, 1st degree lac, ERAS for pain, voiding, ambulating, bonding with

## 2024-11-14 ENCOUNTER — HOSPITAL ENCOUNTER (OUTPATIENT)
Dept: LACTATION | Facility: HOSPITAL | Age: 24
Discharge: HOME | End: 2024-11-14
Payer: COMMERCIAL

## 2024-11-14 NOTE — LACTATION NOTE
Mother's Name:  Mara Moreno  G/P:    2/1  Significant Medical History: Cardiac arrest, V-Fib, Debfib,   Maternal Breast Assessment:  bilateral pendulous soft breasts with milk easily expressed with forceful ejection      Bruising noted to upper areolae bilaterally:  previous feeds infant not latched to nipple   Meds:    Metoporolol is now being taken rather than Corgard( L4)    Infant's Name:  Larry Moreno  Date of Birth:   [birth year]  Gestational age at Birth: 37-2  Age:    3 days  Physician:   Lizabeth Bui MD                     Reason for Visit:  Infant not latching well; syringe feeding w EBM          Infant's Birth weight:  5-14.2 2670g  Discharge Weight:  5-9.4 2535  Wt Loss:  5.1%    Today's Weight:     5-8.0 2494g    Wt Loss:  6.6%    Feeding History Since Discharge/Last Lactation Appt.: Mom has been feeding with EBM syringes 10-15 per feed every 3 hours. Mom pumps with each feed, for 15 mins, collecting 20 mls per session. Spectra size 22 mm flanges.    Past 24 Hours Voids/Stools:  3+ / 4+      Color of Stool: greenish     Time at Breast:         Right Breast: 25 mins No gain/loss   Left Breast:  15 misn -2 mls    Total Minutes:     40        Total Weight Gain:      -2    gms    Average Feeding Amount for Age: 15-30 mls, or 1/2 - 1 oz, every feed, every 2-3 hours, or 8-12 times in 24 hours.     Interventions: Significant assistance given for positioning in football hold, Haakaa use, and latching. Mother attempted many times with some successes. Larry very fussy at breast. Mother tearful fearing she is insufficient at bfing. Infant finally became relaxed and content while latched with occasional comfort sucking (on first breast). Emphasized to mother how this is a measure of success, considering Larry was not happy at breast at all upon offering breast at first. Larry  latched easily to L breast with mother performing independently.  provided at-breast syringe enticement to keep Larry happy  "and suckling at breast. Mother able to endure infant sucking at breast with no pain. This was first pain free latch since discharge. Use skin to skin calming several times at breast as needed.  Mother collected 40 mls with Haakaa. Pt has 10 mls EBM from previous pumping session.   Suck Training:   Noted infant \"chomping\" with pressure at front of mouth, mechanical-style. Allowed finger to be drawn in further, and stroked roof alternating with tongue depression to urge proper sucking at breast. Taught same to mother.   Tongue Tie:   Pt reports infant is tongue-tied and voices concern. Advised pt to discuss same with primary Provider. Educated on issues which can be impacted by tethered oral  tissue:  bfing problems, insufficient milk removal, refux, speech problems later. However, emphasized that many mother bf well and have no later issues without having tongue tie revised. Patient handouts given.   Tongue Eval:  Infant able to extend to lower gum ridge, good elevation, no lateraization; tongue bunches instead. Anchoring easily visible at anterior. Unable to discern thickness of attachment.     Education: positioning, latching, breast compressions,     Notified MD/ Orders Received: NA    Feeding Plan:   BF at first cues going no longer than 3 hours between feeds.  Use football hold. Do finger suck training prior to latch.  Calm skin to skin on chest as needed.  After bfing, give Larry 15-30 mls in a syringe   Use haakaa for every bf.  Pump for 20 mins.  Keep out needed for the next supplement. Put remainder in fridge.  Use zip lock pumping hack for fridge storage of parts between pumping sessions.  Wash parts once before bed.  Hold skin to skin often.  Plan of Care:  Interventions require further assessment with [facility] Lactation  Interventions require further assessment with MD    Future Appointments:    Lactation: 9:45 Friday, 11/15/24    Physician: Lizabeth Bui MD  TODAY, 11/15/24     Signature: Radha " Lo, IBCLC    Date:  11/14/24

## 2024-11-15 ENCOUNTER — HOSPITAL ENCOUNTER (OUTPATIENT)
Dept: LACTATION | Facility: HOSPITAL | Age: 24
Discharge: HOME | End: 2024-11-15
Payer: COMMERCIAL

## 2024-11-15 LAB
CYTO UR: NORMAL
LAB AP CASE REPORT: NORMAL
Lab: NORMAL
PATH REPORT.FINAL DX SPEC: NORMAL
PATH REPORT.GROSS SPEC: NORMAL

## 2024-11-15 NOTE — LACTATION NOTE
Mother's Name:                       Mara Moreno  G/P:                                         2/1  Significant Medical History:    Cardiac arrest, V-Fib, Debfib,   Maternal Breast Assessment:  bilateral pendulous soft breasts with milk easily expressed with forceful ejection      [place] spot w red center intact on LUOQ; appeared last night, warm upon palpation, not tender (not bite-like)                                                  No knot associated with pink spot.  Meds:  Metoporolol is now being taken rather than Corgard( L4)  Support Person:  Yusef,      Infant's Name:                       Larry Moreno  Date of Birth:                           [birth year]  Gestational age at Birth:         37-2  Age:                                         4 days  Physician:                                Lizabeth Bui MD                     Reason for Visit:                     Confirming improved latch and transfer afterr tongue tie revised (yesterday)                     Infant's Birth weight:                5-14.2 2670g  Discharge Weight:                  5-9.4 2535                Wt Loss:  5.1%  Previous Weight  5-8.0 2494g  Wt Loss:  6.6%    Today's Weight:                  5-9.0    2522g              Wt Loss:  5.5%     Feeding History Since Discharge/Last Lactation Appt.: Mara has been bfing each feed and Larry is  rec'ing EBM supplements per syringe, 20 mls each feed. Mom pumping every 3 hours, maximum collected per session:  90 mls. Parents using syringe or paced side lying bottle method for supplements.Infant now satisfied at breast with no enticements needed to stay calm. Pt used finger suck training once to prompt infant to create seal around breast. Pt reports it worked well.               Tongue Tie Revised: by Ped (YANET Bui MD) yesterday in office, 11/15/24  Pt says can FEEL difference in latch, much more comfortable.     Past 24 Hours Voids/Stools:  3-4 / 5      Color of Stool: yellowish green    "  Time at Breast:         left Breast:   20 mins     +2 mls         Right Breast:  15  mins  No gain/loss     Total Minutes:      35       Total Weight Gain:    2      gms     Average Feeding Amount for Age: 30-45 mls, or 1 -1 1/2 oz, every feed, every 2-3 hours, or 8-12 times in 24 hours.      Interventions: Larry went to breast with no protest today. Latched deeply and well in football hold. Parents use waking techniques well as needed. Infant very sleepy at breast, but sucks with swallowing triggers. Unable to discern swallows. No enticements needed to keep her calm. No finger suck training needed to aid creating seal at breast. Sleepiness is main challenge at breast.Anterior anchoring no longer present  Suck Eval:  Infant no longer \"chomping\", but sucking appropriately.    Education: breast friend pillow for easier positioning, \"hacks\" for easier bfing with large breasts; handout given, average feed amounts     Notified MD/ Orders Received: NA     Feeding Plan:   BF at first cues going no longer than 3 hours between feeds.  Use football hold. Do finger suck training prior to latch.  Calm skin to skin on chest as needed.  After bfing, give Larry 30 mls in a syringe or bottle with paced method.  Gradually increase supplements by 15 mls, so that by Sunday, her minimum supplement (or entire bottle) is 60 mls per feed.  Use haakaa for every bf.  Pump for 20 mins.  Keep out needed for the next supplement. Put remainder in fridge.  Use zip lock pumping hack for fridge storage of parts between pumping sessions.  Wash parts once before bed.  Hold skin to skin often.  At night:  omit bfing practice... pump while Larry is bottle-feeding, then go back to bed    Plan of Care:  Interventions require further assessment with [facility] Lactation  Interventions require further assessment with MD     Future Appointments:     Lactation:        11/20/24, 10 am Wed     Physician:        Lizabeth Bui MD  at 2 weeks           "      Signature:        Radha Blanchard, IBCLC     Date:                11/15/24

## 2024-11-20 ENCOUNTER — HOSPITAL ENCOUNTER (OUTPATIENT)
Dept: LACTATION | Facility: HOSPITAL | Age: 24
Discharge: HOME | End: 2024-11-20
Payer: COMMERCIAL

## 2024-11-20 NOTE — LACTATION NOTE
"Mother's Name:                       Mara Moreno  G/P:                                         2/1  Significant Medical History:    Cardiac arrest, V-Fib, Debfib,   Maternal Breast Assessment:  bilateral pendulous soft breasts with milk easily expressed with forceful ejection  Wing spot w red center intact on LUOQ; appeared last night, warm upon palpation, not tender (not bite-like) No knot associated with pink spot. This spot is smaller today than 5 days ago, but still present. No pain associated with spot.  Meds:  Metoporolol is now being taken rather than Corgard( L4)  Support Person:                      Yusef,      Infant's Name:                       Larry Moreno  Date of Birth:                           [birth year]  Gestational age at Birth:         37-2  Age:                                         9 days  Physician:                                Lizabeth Bui MD                     Reason for Visit:                     Confirming improved latch and transfer at breast                     Infant's Birth weight:                5-14.2 2670g  Discharge Weight:                  5-9.4    2535                Wt Loss:  5.1%  Previous Weight                      5-8.0    2494g              Wt Loss:  6.6%  Last Weight   5-9.0 2522g  Wt . Loss:  5.5%     Today's Weight:                  5-14.3    2522g              Back to Birth Weight     Feeding History Since Discharge/Last Lactation Appt.: Pt attempts bfing at every feed. Only 2-3 times, at breast,  does mother feel that infant actually transfers milk. Larry continues to be fussy at breast. Pt uses appropriate calming techniques, but when infant returns to breast, fussiness ensures. Syringe at breast, paci sucking (\"bait and switch\") calming on chest intermittently. Parents give 60-70 mls per bottle and paced method AFTER bfing attempts. Larry now waking on own by 3rd hour for feeding. Infant now seldom has difficulty creating seal around breast. Suck " "training with finger in mouth not needed as often prior to a bfing session. Bf's remain comfortable for pt since tongue tie revision 5 days ago. Mom collects significant amounts with Haakaa.  Pumping:  Pt pumping for 20 mins every 3 hours using correct flange size, Spectra. Current amounts per session:   mls.      Past 24 Hours Voids/Stools:  5  /   5-7    Color of Stool: yellowish      Time at Breast:     (syringe enticements of EBM given; 1 ml)    left Breast:   18 mins            + 2mls  Right Breast:  18  mins  No gain               Total Minutes:      36      Total Weight Gain:      2   gms     Average Feeding Amount for Age: 60-90 mls, or 2-3 oz, every feed, every 2-3 hours, or 8-12 times in 24 hours.      Interventions:  Larry initially bobbing on / off breast with latch attempts, becoming fussy. Mom using football hold. Dad used EBM enticement attempting to prompt feeding. Per parent's desire, and to avoid increasing protests at breast, nipple shield introduced. Larry latched deeply and remained on breast with no resistance. However, she is sleepy at breast requiring constant waking prompts. Mother used [place] reflex stim and breast compressions with verbal cues for same.    Notified MD/ Orders Received: NA     Feeding Plan:   BF at first cues going no longer than 3 hours between feeds.  Use football hold.   Calm skin to skin on chest, using pacifier \"bait and switch\", and syringe enticements as needed.  After bfing, give Larry 70-90 mls in a bottle with paced method.  Use haakaa for every bf.  Pump for 20 mins.  Keep out needed for the next supplement. Put remainder in fridge.  Keep using zip lock pumping hack for fridge storage of parts between pumping sessions.  Wash parts once before bed.  Hold skin to skin often.  At night:  omit bfing practice... pump while Larry is bottle-feeding, then go back to bed     Plan of Care:  Interventions require further assessment with [facility] " Lactation  Interventions require further assessment with MD     Future Appointments:     Lactation:        11/29/24, 9 am Friday, AT [facility], 2nd floor     Physician:        Lizabeth Bui MD  at 2 weeks                Signature:        MARY Salinas     Date:                11/15/24

## 2024-11-22 ENCOUNTER — MATERNAL SCREENING (OUTPATIENT)
Dept: CALL CENTER | Facility: HOSPITAL | Age: 24
End: 2024-11-22
Payer: COMMERCIAL

## 2024-11-22 NOTE — OUTREACH NOTE
Maternal Screening Survey      Flowsheet Row Responses   Facility patient discharged from? [place]   Attempt successful? Yes   Call start time 1037   Call end time 1040   I have been able to laugh and see the funny side of things. 0   I have looked forward with enjoyment to things. 0   I have blamed myself unnecessarily when things went wrong. 2   I have been anxious or worried for no good reason. 2   I have felt scared or panicky for no good reason. 0   Things have been getting on top of me. 1   I have been so unhappy that I have had difficulty sleeping. 0   I have felt sad or miserable. 0   I have been so unhappy that I have been crying. 0   The thought of harming myself has occurred to me. 0   [place]  Depression Scale Total 5   Did any of your parents have problems with alcohol or drug use? No   Do any of your peers have problems with alcohol or drug use? No   Does your partner have problems with alcohol or drug use? No   Before you were pregnant did you have problems with alcohol or drug use? (past) No   In the past month, did you drink beer, wine, liquor or use any other drugs? (pregnancy) No   Maternal Screening call completed Yes   Call end time 1040              Doris JONES - Registered Nurse

## 2024-11-26 ENCOUNTER — POSTPARTUM VISIT (OUTPATIENT)
Age: 24
End: 2024-11-26
Payer: COMMERCIAL

## 2024-11-26 VITALS
WEIGHT: 249 LBS | DIASTOLIC BLOOD PRESSURE: 82 MMHG | SYSTOLIC BLOOD PRESSURE: 134 MMHG | BODY MASS INDEX: 40.02 KG/M2 | HEIGHT: 66 IN

## 2024-11-26 DIAGNOSIS — R30.0 BURNING WITH URINATION: ICD-10-CM

## 2024-11-26 DIAGNOSIS — Z91.89 AT RISK FOR POSTPARTUM DEPRESSION: ICD-10-CM

## 2024-11-26 LAB
BILIRUB BLD-MCNC: NEGATIVE MG/DL
CLARITY, POC: CLEAR
COLOR UR: YELLOW
GLUCOSE UR STRIP-MCNC: NEGATIVE MG/DL
KETONES UR QL: NEGATIVE
LEUKOCYTE EST, POC: ABNORMAL
NITRITE UR-MCNC: NEGATIVE MG/ML
PH UR: 5 [PH] (ref 5–8)
PROT UR STRIP-MCNC: NEGATIVE MG/DL
RBC # UR STRIP: ABNORMAL /UL
SP GR UR: 1.01 (ref 1–1.03)
UROBILINOGEN UR QL: NORMAL

## 2024-11-26 NOTE — PROGRESS NOTES
"Subjective   Chief Complaint   Patient presents with    Postpartum Care     Pt here for pp mood check. Vaginal delivery 2024, baby girl. Pt states that she is having a hard time with her depression due to possibly getting a divorce. Pt states that her  moved out last night. Pt is having some burning around her tears from vaginal delivery and is asking for UA.      Mara Moreno is a 24 y.o. year old  presenting to be seen for a postpartum visit for a mood check.  She had a vaginal delivery.   Prenatal course was complicated by a history of spontaneous v-fib with a cardiac defibrillator now in place, gestational diabetes, and IUGR.     Feeling increased stress at this time, as she has learned of infidelity by her . He moved from their home last night. She has a healthy support system with her family. Vaginal discomfort still around the areas of her lacerations or abrasions. Bleeding is getting lighter.      The following portions of the patient's history were reviewed and updated as appropriate:problem list, current medications, and allergies    Social History     Socioeconomic History    Marital status:      Spouse name: Yusef   Tobacco Use    Smoking status: Never     Passive exposure: Never    Smokeless tobacco: Never   Vaping Use    Vaping status: Never Used   Substance and Sexual Activity    Alcohol use: Never     Comment: rare    Drug use: No    Sexual activity: Yes     Partners: Male     Birth control/protection: None     Review of Systems      Objective   /82   Ht 167.6 cm (66\")   Wt 113 kg (249 lb)   LMP 2024 (Exact Date)   Breastfeeding Yes   BMI 40.19 kg/m²     General:  well developed; well nourished  no acute distress  Depressed and anxious mood, normal affect, appropriate behavior   Abdomen: soft, non-tender; no masses   Pelvis: Not performed.     Pap smear: 2023 [facility]     Diagnoses and all orders for this visit:    1. Postpartum care following " vaginal delivery (Primary)  Discussed postpartum self-care measures for physical health.  Continue with pelvic rest while resuming other activities as tolerated.  Discussed signs to report.    2. At risk for postpartum depression  EPDS today with score of 7.  Support offered.  Has a healthy support system with her family.  Discussed self-care measures for mental wellbeing, counseling, medications.  To notify provider if symptoms worsen.  No plans or thoughts of self-harm.    3. Lactating mother  Outpatient lactation clinic support as needed.  Discussed signs to report.  Continue with prenatal vitamin or start a  vitamin while breast-feeding.  Ensure adequate hydration and nutrition with an increased calorie intake by 500 philip/day for lactation support.    4. Burning with urination  Dipstick analysis today and reviewed: negative for signs of infection. Encouraged adequate hydration and continued use of comfort products and mariusz-bottle.  Sitz bath encouraged.  -     POC Urinalysis Dipstick        Refer to AVS instructions for additional education provided with today's visit.        Return to clinic in 1 to 2 weeks for postpartum mood follow-up and as needed with concerns.        This note was electronically signed.    Kaelyn Rogers, ELLIE, APRN, CNM  2024

## 2024-12-02 ENCOUNTER — TELEPHONE (OUTPATIENT)
Dept: LACTATION | Facility: HOSPITAL | Age: 24
End: 2024-12-02
Payer: COMMERCIAL

## 2024-12-02 NOTE — TELEPHONE ENCOUNTER
Called pt as she did not present for OP lactation appointment today. She is sick. Offered to reschedule. She plans to call and do same when feeling better. No discussion of bfing.   
37

## 2024-12-19 ENCOUNTER — TELEPHONE (OUTPATIENT)
Dept: OBSTETRICS AND GYNECOLOGY | Age: 24
End: 2024-12-19

## 2024-12-19 NOTE — TELEPHONE ENCOUNTER
Caller: ANTHONY BURNETTE    Best call back number: [phone]    PATIENT CALLED REQUESTING TO CANCEL SAME DAY APPT.    Did the patient call AFTER the start time of their scheduled appointment?  []YES  [x]NO    Was the patient's appointment rescheduled? []YES  [x]NO    Any additional information: NOTHING AVAILABLE

## 2024-12-30 ENCOUNTER — POSTPARTUM VISIT (OUTPATIENT)
Dept: OBSTETRICS AND GYNECOLOGY | Age: 24
End: 2024-12-30
Payer: COMMERCIAL

## 2024-12-30 VITALS
BODY MASS INDEX: 40.37 KG/M2 | DIASTOLIC BLOOD PRESSURE: 84 MMHG | HEIGHT: 66 IN | SYSTOLIC BLOOD PRESSURE: 122 MMHG | WEIGHT: 251.2 LBS

## 2024-12-30 DIAGNOSIS — Z71.85 HPV VACCINE COUNSELING: ICD-10-CM

## 2024-12-30 DIAGNOSIS — Z30.09 ENCOUNTER FOR OTHER GENERAL COUNSELING OR ADVICE ON CONTRACEPTION: ICD-10-CM

## 2024-12-30 DIAGNOSIS — Z86.32 HISTORY OF GESTATIONAL DIABETES: ICD-10-CM

## 2024-12-30 RX ORDER — SERTRALINE HYDROCHLORIDE 100 MG/1
150 TABLET, FILM COATED ORAL DAILY
Qty: 45 TABLET | Refills: 1 | Status: SHIPPED | OUTPATIENT
Start: 2024-12-30 | End: 2025-03-03

## 2024-12-30 RX ORDER — ACETAMINOPHEN AND CODEINE PHOSPHATE 120; 12 MG/5ML; MG/5ML
1 SOLUTION ORAL DAILY
Qty: 28 TABLET | Refills: 12 | Status: SHIPPED | OUTPATIENT
Start: 2024-12-30 | End: 2025-12-30

## 2024-12-30 NOTE — PROGRESS NOTES
Faizan Katz MD  [facility]  [address]  [place] [address]  Office [phone]  Fax [phone]      [facility]  Mara Moreno  : 2000  MRN: [id number]    Subjective   Subjective     Chief Complaint   Patient presents with    Postpartum Care     Pt here for postpartum visit. Delivered vaginal on  at 37w2d.  PPDS: 12. States she is getting a divorce.  Wants to start bc.       History of Present Illness  Postpartum Visit  Patient is here for a postpartum visit. She is 6 weeks postpartum following a spontaneous vaginal delivery.     Pregnancy complicated by:  Fetal growth restriction  Abnormal umbilical artery dopplers, elevated  Gestational diabetes, diet controlled  GBS bacteruria  Maternal cardiovascular disease affecting pregnancy, h/o spontaneous v-fib  Cardiac defibrillator in place  Allergy to amoxicillin, penicillin, cephalosporin    Postpartum course has been uncomplicated.   Baby's course has been uncomplicated.     Baby is feeding by breast. Bleeding no bleeding. Did have a light menses. Bowel function is normal. Bladder function is normal. Patient is not sexually active. Contraception method is abstinence. Postpartum depression screening: positive. Denies SI/HI. Getting a divorce. Not depressed because of baby. Zoloft not really helping from what she can tell though she has been on this for some time.     Review of Systems   Constitutional:  Negative for activity change, appetite change, chills, fatigue and fever.   Respiratory:  Negative for cough and shortness of breath.    Cardiovascular:  Negative for chest pain and leg swelling.   Gastrointestinal:  Negative for abdominal pain, constipation, diarrhea, nausea and vomiting.   Genitourinary:  Negative for decreased urine volume, dysuria, frequency, menstrual problem, pelvic pain, urgency, vaginal bleeding, vaginal discharge and vaginal pain.   Psychiatric/Behavioral:  Positive for sleep disturbance. Negative for  "decreased concentration, dysphoric mood, self-injury and suicidal ideas. The patient is nervous/anxious.    All other systems reviewed and are negative.         Objective    Objective     Vitals:   Visit Vitals  /84   Ht 167.6 cm (66\")   Wt 114 kg (251 lb 3.2 oz)   Breastfeeding Yes   BMI 40.54 kg/m²        Physical Exam  Vitals and nursing note reviewed.   Constitutional:       General: She is not in acute distress.     Appearance: Normal appearance. She is not ill-appearing.   HENT:      Head: Normocephalic and atraumatic.      Nose: No congestion or rhinorrhea.   Eyes:      General: No scleral icterus.        Right eye: No discharge.         Left eye: No discharge.      Extraocular Movements: Extraocular movements intact.      Conjunctiva/sclera: Conjunctivae normal.   Pulmonary:      Effort: Pulmonary effort is normal. No accessory muscle usage or respiratory distress.   Abdominal:      General: Abdomen is flat.      Palpations: Abdomen is soft.      Tenderness: There is no abdominal tenderness.   Musculoskeletal:      Right lower leg: No edema.      Left lower leg: No edema.   Skin:     General: Skin is warm and dry.      Coloration: Skin is not ashen, cyanotic or jaundiced.   Neurological:      General: No focal deficit present.      Mental Status: She is alert and oriented to person, place, and time.      Deep Tendon Reflexes: Reflexes normal.   Psychiatric:         Mood and Affect: Mood normal.         Behavior: Behavior is cooperative.           Result Review    Postpartum Depression: High Risk (2024)    [place]  Depression Scale     Last EPDS Total Score: 12     Last EPDS Self Harm Result: Never             Assessment & Plan   Assessment / Plan     Diagnoses and all orders for this visit:    1. Postpartum care following vaginal delivery (Primary)    2. Lactating mother    3. Encounter for other general counseling or advice on contraception  -     norethindrone (MICRONOR) 0.35 MG " tablet; Take 1 tablet by mouth Daily.  Dispense: 28 tablet; Refill: 12    4. Postpartum depression  -     sertraline (ZOLOFT) 100 MG tablet; Take 1.5 tablets by mouth Daily for 30 days.  Dispense: 45 tablet; Refill: 1    5. History of gestational diabetes    6. HPV vaccine counseling  -     HPV 9-Valent Recomb Vaccine suspension 1 each      Meeting postpartum milestones.   No restrictions  Mood/Depression - increase zoloft to 150 mg daily. Going to counseling. Return for follow-up  Contraception - desires pills, start progesterone only as lactating.   History of gestational diabetes in pregnancy - needs 2-hr GTT, plan for this at follow-up visit  HPV vaccine - desires, 1st dose today  Questions answered. She expressed understanding.       Return in about 4 weeks (around 1/27/2025) for Recheck, 2-hr GTT.      Faizan Katz MD

## 2025-01-29 ENCOUNTER — POSTPARTUM VISIT (OUTPATIENT)
Dept: OBSTETRICS AND GYNECOLOGY | Age: 25
End: 2025-01-29
Payer: COMMERCIAL

## 2025-01-29 VITALS
BODY MASS INDEX: 40.85 KG/M2 | WEIGHT: 254.2 LBS | DIASTOLIC BLOOD PRESSURE: 82 MMHG | SYSTOLIC BLOOD PRESSURE: 124 MMHG | HEIGHT: 66 IN

## 2025-01-29 DIAGNOSIS — Z86.32 HISTORY OF GESTATIONAL DIABETES: Primary | ICD-10-CM

## 2025-01-29 DIAGNOSIS — Z30.41 ENCOUNTER FOR SURVEILLANCE OF CONTRACEPTIVE PILLS: ICD-10-CM

## 2025-01-29 DIAGNOSIS — Z71.85 HPV VACCINE COUNSELING: ICD-10-CM

## 2025-01-29 NOTE — PROGRESS NOTES
"    Faizan Katz MD  [facility]  [address]  [place] [address]  Office [phone]  Fax [phone]      [facility]  Mara Moreno  : 2000  MRN: [id number]    Subjective   Subjective     Chief Complaint   Patient presents with    Postpartum Care     Pt here for postpartum visit. Delivered vaginal on  at 37w2d. 2 hr GTT.  PPDS: 7       History of Present Illness  Mara Moreno is a 24 y.o. female , , who comes to the office today for follow-up. Reports doing well. Mood better on zoloft. No SI/HI. Pumping - going well. GTT today. No other complaints    Review of Systems   Constitutional:  Negative for activity change, appetite change, chills, fatigue and fever.   Respiratory:  Negative for cough and shortness of breath.    Cardiovascular:  Negative for chest pain and leg swelling.   Gastrointestinal:  Negative for abdominal pain, constipation, diarrhea, nausea and vomiting.   Genitourinary:  Negative for decreased urine volume, dysuria, frequency, menstrual problem, pelvic pain, urgency, vaginal bleeding, vaginal discharge and vaginal pain.   Psychiatric/Behavioral:  Negative for decreased concentration, dysphoric mood, self-injury, sleep disturbance and suicidal ideas. The patient is not nervous/anxious.    All other systems reviewed and are negative.       The following portions of the patient's history were reviewed and updated as appropriate: allergies, current medications, past family history, past medical history, past social history, past surgical history, and problem list.    Objective    Objective     Vitals:   Visit Vitals  /82   Ht 167.6 cm (66\")   Wt 115 kg (254 lb 3.2 oz)   Breastfeeding Yes   BMI 41.03 kg/m²        Physical Exam  Vitals reviewed.   Constitutional:       General: She is not in acute distress.     Appearance: Normal appearance. She is not ill-appearing.   HENT:      Head: Normocephalic and atraumatic.      Nose: No congestion or " rhinorrhea.   Eyes:      General: No scleral icterus.        Right eye: No discharge.         Left eye: No discharge.      Extraocular Movements: Extraocular movements intact.      Conjunctiva/sclera: Conjunctivae normal.   Pulmonary:      Effort: Pulmonary effort is normal. No accessory muscle usage or respiratory distress.   Musculoskeletal:      Right lower leg: No edema.      Left lower leg: No edema.   Skin:     General: Skin is warm and dry.      Coloration: Skin is not ashen, cyanotic or jaundiced.   Neurological:      General: No focal deficit present.      Mental Status: She is alert and oriented to person, place, and time.   Psychiatric:         Mood and Affect: Mood normal.         Behavior: Behavior is cooperative.         Result Review    Postpartum Depression: Medium Risk (2025)    [place]  Depression Scale     Last EPDS Total Score: 7     Last EPDS Self Harm Result: Never             Assessment & Plan   Assessment / Plan     Diagnoses and all orders for this visit:    1. History of gestational diabetes (Primary)  -     Glucose (2 Spec, WHO) Matilda, S    2. Postpartum depression    3. Encounter for surveillance of contraceptive pills    4. Lactating mother    5. HPV vaccine counseling        Discussion:   Doing well. GTT today. Continue zoloft 100 mg daily for depression. Continue micronor 0.35 mg daily. HPV vaccine #2 due after . Advised come back later next week for this.     Follow-up: Return in about 6 months (around 2025), or if symptoms worsen or fail to improve, for annual.    Faizan Katz MD

## 2025-01-30 LAB
GLUCOSE 2H P 75 G GLC PO SERPL-MCNC: 104 MG/DL (ref 70–139)
GLUCOSE P FAST SERPL-MCNC: 100 MG/DL (ref 70–99)

## 2025-02-04 ENCOUNTER — CLINICAL SUPPORT (OUTPATIENT)
Dept: OBSTETRICS AND GYNECOLOGY | Age: 25
End: 2025-02-04
Payer: COMMERCIAL

## 2025-02-04 DIAGNOSIS — Z23 NEED FOR HPV VACCINATION: Primary | ICD-10-CM

## 2025-02-04 NOTE — PROGRESS NOTES
Patient presented to office for 2nd HPV vaccine. Verified that patient was due for it. She was due after . Verified name,  & allergies. Patient tolerated injection well. Next due 25

## 2025-04-02 ENCOUNTER — TELEPHONE (OUTPATIENT)
Dept: OBSTETRICS AND GYNECOLOGY | Age: 25
End: 2025-04-02
Payer: COMMERCIAL

## 2025-04-02 DIAGNOSIS — N61.0 ACUTE MASTITIS: Primary | ICD-10-CM

## 2025-04-02 RX ORDER — SULFAMETHOXAZOLE AND TRIMETHOPRIM 800; 160 MG/1; MG/1
1 TABLET ORAL 2 TIMES DAILY
Qty: 20 TABLET | Refills: 0 | Status: SHIPPED | OUTPATIENT
Start: 2025-04-02 | End: 2025-04-12

## 2025-04-02 NOTE — TELEPHONE ENCOUNTER
Pt calling to report streaking in left breast and tenderness to touch. Pt report she is breastfeeding/pumping.Pt denies any fever, chills or discharge at this point. Pt delivered 11/11. Pt would like to know if she can be sent an abx for symptoms. Please advise.

## (undated) DEVICE — Device

## (undated) DEVICE — CVR UNIV C/ARM

## (undated) DEVICE — GLV SURG TRIUMPH GREEN W/ALOE PF LTX 8 STRL

## (undated) DEVICE — PK TURNOVER RM ADV

## (undated) DEVICE — GLV SURG SIGNATURE TOUCH PF LTX 8 STRL

## (undated) DEVICE — PK EXTRM 30

## (undated) DEVICE — CVR BRD ARM 13X30

## (undated) DEVICE — TAP CANN 4.5

## (undated) DEVICE — DISPOSABLE TOURNIQUET CUFF SINGLE BLADDER, SINGLE PORT AND QUICK CONNECT CONNECTOR: Brand: COLOR CUFF

## (undated) DEVICE — DRP C/ARMOR